# Patient Record
Sex: FEMALE | Race: WHITE | NOT HISPANIC OR LATINO | Employment: OTHER | URBAN - METROPOLITAN AREA
[De-identification: names, ages, dates, MRNs, and addresses within clinical notes are randomized per-mention and may not be internally consistent; named-entity substitution may affect disease eponyms.]

---

## 2017-02-03 NOTE — PROGRESS NOTES
SUBJECTIVE:     CC: Rossy Nagel is an 31 year old woman who presents for preventive health visit.     Physical  Annual:     Getting at least 3 servings of Calcium per day::  NO    Bi-annual eye exam::  Yes    Dental care twice a year::  NO    Sleep apnea or symptoms of sleep apnea::  Daytime drowsiness    Diet::  Regular (no restrictions)    Frequency of exercise::  None    Taking medications regularly::  Yes    Medication side effects::  None    Additional concerns today::  YES        Concern - Hemorrhoids      Onset: 2009     Description:   Bleeding    Intensity: moderate    Progression of Symptoms:  worsening and bleeding with each BM and increasing in size    Accompanying Signs & Symptoms:  Pain with BM,       Previous history of similar problem:   N/A    Precipitating factors:   Worsened by: BM    Alleviating factors:  Improved by: tucks pads     Therapies Tried and outcome: N/A    Today's PHQ-2 Score:   PHQ-2 ( 1999 Pfizer) 2/9/2017   Q1: Little interest or pleasure in doing things -   Q2: Feeling down, depressed or hopeless -   PHQ-2 Score -   Little interest or pleasure in doing things Not at all   Feeling down, depressed or hopeless Several days   PHQ-2 Score 1       Abuse: Current or Past(Physical, Sexual or Emotional)- No  Do you feel safe in your environment - Yes    Social History   Substance Use Topics     Smoking status: Never Smoker      Smokeless tobacco: Never Used     Alcohol Use: Yes      Comment: Rarely     The patient does not drink >3 drinks per day nor >7 drinks per week.    Recent Labs   Lab Test  07/14/14   1413   CHOL  155   HDL  44*   LDL  94   TRIG  83   CHOLHDLRATIO  3.0       Reviewed orders with patient.  Reviewed health maintenance and updated orders accordingly - Yes    Mammo Decision Support:  Mammogram not appropriate for this patient based on age.    Pertinent mammograms are reviewed under the imaging tab.  History of abnormal Pap smear: NO - age 30- 65 PAP every 3 years  "recommended  All Histories reviewed and updated in Epic.      ROS:  C: NEGATIVE for fever, chills, change in weight  I: NEGATIVE for worrisome rashes, moles or lesions  E: NEGATIVE for vision changes or irritation  ENT: NEGATIVE for ear, mouth and throat problems  R: NEGATIVE for significant cough or SOB  B: NEGATIVE for masses, tenderness or discharge  CV: NEGATIVE for chest pain, palpitations or peripheral edema  GI: NEGATIVE for nausea, abdominal pain, heartburn, or change in bowel habits  : NEGATIVE for unusual urinary or vaginal symptoms. No vaginal bleeding.  M: NEGATIVE for significant arthralgias or myalgia  N: NEGATIVE for weakness, dizziness or paresthesias  P: increased anxiety    Problem list, Medication list, Allergies, and Medical/Social/Surgical histories reviewed in [facility] and updated as appropriate.  OBJECTIVE:     /68 mmHg  Pulse 64  Temp(Src) 98.4  F (36.9  C) (Oral)  Resp 16  Ht 5' 1\" (1.549 m)  Wt 137 lb (62.143 kg)  BMI 25.90 kg/m2  LMP 01/20/2017 (Exact Date)  Breastfeeding? No  EXAM:  GENERAL: healthy, alert and no distress  EYES: Eyes grossly normal to inspection, PERRL and conjunctivae and sclerae normal  HENT: ear canals and TM's normal, nose and mouth without ulcers or lesions  NECK: no adenopathy, no asymmetry, masses, or scars and thyroid normal to palpation  RESP: lungs clear to auscultation - no rales, rhonchi or wheezes  BREAST: normal without masses, tenderness or nipple discharge and no palpable axillary masses or adenopathy  CV: regular rate and rhythm, normal S1 S2, no S3 or S4, no murmur, click or rub, no peripheral edema and peripheral pulses strong  ABDOMEN: soft, nontender, no hepatosplenomegaly, no masses and bowel sounds normal   (female): normal female external genitalia, normal urethral meatus, vaginal mucosa pink, moist, well rugated, and normal cervix/adnexa/uterus without masses or discharge  RECTAL: external hemorrhoid  MS: no gross musculoskeletal " "defects noted, no edema  SKIN: no suspicious lesions or rashes  NEURO: Normal strength and tone, mentation intact and speech normal  PSYCH: mentation appears normal, affect normal/bright    ASSESSMENT/PLAN:         ICD-10-CM    1. Encounter for routine adult health examination without abnormal findings Z00.00    2. Need for prophylactic vaccination and inoculation against influenza Z23 FLU VAC, SPLIT VIRUS IM > 3 YO (QUADRIVALENT) [85012]     Vaccine Administration, Initial [39112]   3. Anxiety F41.9 sertraline (ZOLOFT) 100 MG tablet   4. Major depressive disorder, recurrent episode, moderate (H) F33.1 sertraline (ZOLOFT) 100 MG tablet   5. Screening for cervical cancer Z12.4 Pap imaged thin layer screen with HPV - recommended age 30 - 65 years (select HPV order below)     HPV High Risk Types DNA Cervical     F/u 6 months mood.  Would like to keep meds the same.  Declines any change.  Had a work conflict 6 months ago that is not yet resolved and discussed need to get closure on this (either get new job or discuss with manager and work it out) has been avoiding. Likely would do well with counseling, but declines.      COUNSELING:  Reviewed preventive health counseling, as reflected in patient instructions       Regular exercise       Healthy diet/nutrition         reports that she has never smoked. She has never used smokeless tobacco.    Estimated body mass index is 25.9 kg/(m^2) as calculated from the following:    Height as of this encounter: 5' 1\" (1.549 m).    Weight as of this encounter: 137 lb (62.143 kg).   Weight management plan: Discussed healthy diet and exercise guidelines and patient will follow up in 12 months in clinic to re-evaluate.    Counseling Resources:  ATP IV Guidelines  Pooled Cohorts Equation Calculator  Breast Cancer Risk Calculator  FRAX Risk Assessment  ICSI Preventive Guidelines  Dietary Guidelines for Americans, 2010  USDA's MyPlate  ASA Prophylaxis  Lung CA Screening    Carmita Ramos MD, " MD  [facility]

## 2017-02-09 ENCOUNTER — OFFICE VISIT (OUTPATIENT)
Dept: FAMILY MEDICINE | Facility: OTHER | Age: 32
End: 2017-02-09

## 2017-02-09 VITALS
SYSTOLIC BLOOD PRESSURE: 106 MMHG | TEMPERATURE: 98.4 F | HEIGHT: 61 IN | HEART RATE: 64 BPM | WEIGHT: 137 LBS | DIASTOLIC BLOOD PRESSURE: 68 MMHG | RESPIRATION RATE: 16 BRPM | BODY MASS INDEX: 25.86 KG/M2

## 2017-02-09 DIAGNOSIS — Z23 NEED FOR PROPHYLACTIC VACCINATION AND INOCULATION AGAINST INFLUENZA: ICD-10-CM

## 2017-02-09 DIAGNOSIS — Z12.4 SCREENING FOR CERVICAL CANCER: ICD-10-CM

## 2017-02-09 DIAGNOSIS — F41.9 ANXIETY: ICD-10-CM

## 2017-02-09 DIAGNOSIS — Z00.00 ENCOUNTER FOR ROUTINE ADULT HEALTH EXAMINATION WITHOUT ABNORMAL FINDINGS: Primary | ICD-10-CM

## 2017-02-09 DIAGNOSIS — F33.1 MAJOR DEPRESSIVE DISORDER, RECURRENT EPISODE, MODERATE (H): ICD-10-CM

## 2017-02-09 PROCEDURE — 87624 HPV HI-RISK TYP POOLED RSLT: CPT | Performed by: FAMILY MEDICINE

## 2017-02-09 PROCEDURE — G0145 SCR C/V CYTO,THINLAYER,RESCR: HCPCS | Performed by: FAMILY MEDICINE

## 2017-02-09 PROCEDURE — 90686 IIV4 VACC NO PRSV 0.5 ML IM: CPT | Performed by: FAMILY MEDICINE

## 2017-02-09 PROCEDURE — 99395 PREV VISIT EST AGE 18-39: CPT | Mod: 25 | Performed by: FAMILY MEDICINE

## 2017-02-09 PROCEDURE — 90471 IMMUNIZATION ADMIN: CPT | Performed by: FAMILY MEDICINE

## 2017-02-09 RX ORDER — SERTRALINE HYDROCHLORIDE 100 MG/1
100 TABLET, FILM COATED ORAL DAILY
Qty: 90 TABLET | Refills: 1 | Status: CANCELLED | OUTPATIENT
Start: 2017-02-09

## 2017-02-09 RX ORDER — SERTRALINE HYDROCHLORIDE 100 MG/1
100 TABLET, FILM COATED ORAL DAILY
Qty: 90 TABLET | Refills: 1 | Status: SHIPPED | OUTPATIENT
Start: 2017-02-09 | End: 2017-08-28

## 2017-02-09 ASSESSMENT — PATIENT HEALTH QUESTIONNAIRE - PHQ9
SUM OF ALL RESPONSES TO PHQ QUESTIONS 1-9: 8
10. IF YOU CHECKED OFF ANY PROBLEMS, HOW DIFFICULT HAVE THESE PROBLEMS MADE IT FOR YOU TO DO YOUR WORK, TAKE CARE OF THINGS AT HOME, OR GET ALONG WITH OTHER PEOPLE: SOMEWHAT DIFFICULT

## 2017-02-09 ASSESSMENT — ANXIETY QUESTIONNAIRES
7. FEELING AFRAID AS IF SOMETHING AWFUL MIGHT HAPPEN: 1 = SEVERAL DAYS
GAD7 TOTAL SCORE: 8
GAD7 TOTAL SCORE: 8

## 2017-02-09 ASSESSMENT — PAIN SCALES - GENERAL: PAINLEVEL: NO PAIN (0)

## 2017-02-09 NOTE — LETTER
[address]  [facility] [address]  [phone]      February 22, 2017    Rossy Nagel  [address] [facility] [address]    Dear Rossy,  We are happy to inform you that your PAP smear result from 2/9/17 is normal.  We are now able to do a follow up test on PAP smears. The DNA test is for HPV (Human Papilloma Virus). Cervical cancer is closely linked with certain types of HPV. Your result showed no evidence of high risk HPV.  Therefore we recommend you return in 3 years for your next pap smear.  You will still need to return to the clinic every year for an annual exam and other preventive tests.  Please contact the clinic with any questions.  Sincerely,  Carmita Ramos MD/becky

## 2017-02-09 NOTE — NURSING NOTE
"Chief Complaint   Patient presents with     Physical     pap due this year     Panel Management     flu       Initial /68 mmHg  Pulse 64  Temp(Src) 98.4  F (36.9  C) (Oral)  Resp 16  Ht 5' 1\" (1.549 m)  Wt 137 lb (62.143 kg)  BMI 25.90 kg/m2  LMP 01/20/2017 (Exact Date)  Breastfeeding? No Estimated body mass index is 25.9 kg/(m^2) as calculated from the following:    Height as of this encounter: 5' 1\" (1.549 m).    Weight as of this encounter: 137 lb (62.143 kg).  Medication Reconciliation: complete  "

## 2017-02-09 NOTE — PROGRESS NOTES
Prior to injection verified patient identity using patient's name and date of birth.  Per orders of Dr. Ramos, injection of flu given by Dora Tolbert. Patient instructed to remain in clinic for 20 minutes afterwards, and to report any adverse reaction to me immediately.    Injectable Influenza Immunization Documentation    1.  Is the person to be vaccinated sick today?  No    2. Does the person to be vaccinated have an allergy to eggs or to a component of the vaccine?  No    3. Has the person to be vaccinated today ever had a serious reaction to influenza vaccine in the past?  No    4. Has the person to be vaccinated ever had Guillain-[place] syndrome?  No     Form completed by Madeline Tolbert, [facility]

## 2017-02-10 ASSESSMENT — PATIENT HEALTH QUESTIONNAIRE - PHQ9: SUM OF ALL RESPONSES TO PHQ QUESTIONS 1-9: 8

## 2017-02-10 ASSESSMENT — ANXIETY QUESTIONNAIRES: GAD7 TOTAL SCORE: 8

## 2017-02-13 LAB
COPATH REPORT: NORMAL
PAP: NORMAL

## 2017-02-15 LAB
FINAL DIAGNOSIS: NORMAL
HPV HR 12 DNA CVX QL NAA+PROBE: NEGATIVE
HPV16 DNA SPEC QL NAA+PROBE: NEGATIVE
HPV18 DNA SPEC QL NAA+PROBE: NEGATIVE
SPECIMEN DESCRIPTION: NORMAL

## 2017-08-23 NOTE — PROGRESS NOTES
"  SUBJECTIVE:                                                    Rossy Nagel is a 32 year old female who presents to clinic today for the following health issues:      Depression   Depression & Anxiety Follow-up:     Depression/Anxiety:  Depression & Anxiety    Status since last visit::  Stable    Other associated symptoms of depression and anxiety::  YES (Cant sleep, once a sleep could constantly sleep, feels tired to fall asleep )    Significant life event::  YES (got a new dog )    Current substance use::  None    PSYCH: The patient reports that her mood is stable with her current medication dosage. She does not want any changes to her dosage at this time. She is currently walking with her dog 1 mile daily which she reports improves her mood.     CONST: The patient reports that her family hx of sensitivity to polyester might be a potential cause for her night sweats. She reports that her night sweat severity has been improving recently. She reports experiencing a \"static-like\" feeling in her mind before sleeping.    Problem list and histories reviewed & adjusted, as indicated.  Additional history: as documented    Patient Active Problem List   Diagnosis     CARDIOVASCULAR SCREENING; LDL GOAL LESS THAN 160     Major depressive disorder, recurrent episode, moderate (H)     Anxiety     IUD (intrauterine device) in place     Past Surgical History:   Procedure Laterality Date     NO HISTORY OF SURGERY         Social History   Substance Use Topics     Smoking status: Never Smoker     Smokeless tobacco: Never Used     Alcohol use Yes      Comment: Rarely     Family History   Problem Relation Age of Onset     DIABETES Mother      Type II     Depression Mother      Obesity Mother      overweight     Hypertension Mother      Hypertension Maternal Grandmother      HEART DISEASE Maternal Grandfather      Bypass     Hypertension Maternal Grandfather      CANCER Paternal Grandmother      ovarian or uterine     Asthma " Brother              ROS:  Constitutional, HEENT, cardiovascular, pulmonary, GI, , musculoskeletal, neuro, skin, endocrine and psych systems are negative, except as in HPI or otherwise noted     This document serves as a record of the services and decisions personally performed and made by Carmita Ramos MD. It was created on her behalf by Bev Shannon , a trained medical scribe. The creation of this document is based the provider's statements to the medical scribe.  Bev Shannon, August 28, 2017 1:55 PM     OBJECTIVE:                                                    /72  Pulse 78  Temp 97.9  F (36.6  C) (Temporal)  Resp 16  Wt 136 lb (61.7 kg)  SpO2 98%  BMI 25.7 kg/m2  Body mass index is 25.7 kg/(m^2).   GENERAL: healthy, alert, well nourished, well hydrated, no distress  SKIN: no suspicious lesions, no rashes  PSYCH: Alert and oriented times 3; speech- coherent , normal rate and volume; able to articulate logical thoughts, able to abstract reason, no tangential thoughts, no hallucinations or delusions, affect- normal    No results found for this or any previous visit (from the past 24 hour(s)).     ASSESSMENT/PLAN:                                                        ICD-10-CM    1. Anxiety F41.9 sertraline (ZOLOFT) 100 MG tablet   2. Major depressive disorder, recurrent episode, moderate (H) F33.1 sertraline (ZOLOFT) 100 MG tablet   3. Overweight E66.3      -The patient reports that her mood is stable with her current medication dosage. She does not want any changes to her dosage at this time. Medication refilled.   -Discussed weight. Advised healthy diet and exercise.  -Discussed night sweat symptoms in some detail. Gave self help info to the patient and the patient declined medication options for sleep specific meds.  Just working toward calming and relaxing techniques as wants to avoid more meds.    Patient Instructions   -Avoid memory foam pillows to reduce heat at night.    -Get into a  meditation/yoga/deep breathing routine daily.    -6 mo F/U for mood. eVisit is ok.      The information in this document, created by the medical scribe for me, accurately reflects the services I personally performed and the decisions made by me. I have reviewed and approved this document for accuracy.   MD Carmita Castañeda MD, MD  [facility]  Answers for HPI/ROS submitted by the patient on 8/28/2017   If you checked off any problems, how difficult have these problems made it for you to do your work, take care of things at home, or get along with other people?: Not difficult at all  PHQ9 TOTAL SCORE: 7  PENNY 7 TOTAL SCORE: 6

## 2017-08-28 ENCOUNTER — OFFICE VISIT (OUTPATIENT)
Dept: FAMILY MEDICINE | Facility: OTHER | Age: 32
End: 2017-08-28
Payer: COMMERCIAL

## 2017-08-28 VITALS
DIASTOLIC BLOOD PRESSURE: 72 MMHG | BODY MASS INDEX: 25.7 KG/M2 | OXYGEN SATURATION: 98 % | RESPIRATION RATE: 16 BRPM | SYSTOLIC BLOOD PRESSURE: 116 MMHG | TEMPERATURE: 97.9 F | WEIGHT: 136 LBS | HEART RATE: 78 BPM

## 2017-08-28 DIAGNOSIS — F41.9 ANXIETY: Primary | ICD-10-CM

## 2017-08-28 DIAGNOSIS — E66.3 OVERWEIGHT: ICD-10-CM

## 2017-08-28 DIAGNOSIS — F33.1 MAJOR DEPRESSIVE DISORDER, RECURRENT EPISODE, MODERATE (H): ICD-10-CM

## 2017-08-28 PROCEDURE — 99214 OFFICE O/P EST MOD 30 MIN: CPT | Performed by: FAMILY MEDICINE

## 2017-08-28 RX ORDER — SERTRALINE HYDROCHLORIDE 100 MG/1
100 TABLET, FILM COATED ORAL DAILY
Qty: 90 TABLET | Refills: 1 | Status: SHIPPED | OUTPATIENT
Start: 2017-08-28 | End: 2018-03-15

## 2017-08-28 ASSESSMENT — PATIENT HEALTH QUESTIONNAIRE - PHQ9
10. IF YOU CHECKED OFF ANY PROBLEMS, HOW DIFFICULT HAVE THESE PROBLEMS MADE IT FOR YOU TO DO YOUR WORK, TAKE CARE OF THINGS AT HOME, OR GET ALONG WITH OTHER PEOPLE: NOT DIFFICULT AT ALL
SUM OF ALL RESPONSES TO PHQ QUESTIONS 1-9: 7
SUM OF ALL RESPONSES TO PHQ QUESTIONS 1-9: 7

## 2017-08-28 ASSESSMENT — ANXIETY QUESTIONNAIRES
7. FEELING AFRAID AS IF SOMETHING AWFUL MIGHT HAPPEN: SEVERAL DAYS
2. NOT BEING ABLE TO STOP OR CONTROL WORRYING: NOT AT ALL
GAD7 TOTAL SCORE: 6
GAD7 TOTAL SCORE: 6
7. FEELING AFRAID AS IF SOMETHING AWFUL MIGHT HAPPEN: SEVERAL DAYS
1. FEELING NERVOUS, ANXIOUS, OR ON EDGE: SEVERAL DAYS
4. TROUBLE RELAXING: SEVERAL DAYS
GAD7 TOTAL SCORE: 6
6. BECOMING EASILY ANNOYED OR IRRITABLE: MORE THAN HALF THE DAYS
5. BEING SO RESTLESS THAT IT IS HARD TO SIT STILL: SEVERAL DAYS
3. WORRYING TOO MUCH ABOUT DIFFERENT THINGS: NOT AT ALL

## 2017-08-28 ASSESSMENT — PAIN SCALES - GENERAL: PAINLEVEL: NO PAIN (0)

## 2017-08-28 NOTE — PATIENT INSTRUCTIONS
-Avoid memory foam pillows to reduce heat at night.    -Get into a meditation/yoga/deep breathing routine daily.    -6 mo F/U for mood. eVisit is ok.

## 2017-08-28 NOTE — MR AVS SNAPSHOT
"              After Visit Summary   8/28/2017    Rossy Nagel    MRN: [id number]           Patient Information     Date Of Birth          1985        Visit Information        Provider Department      8/28/2017 1:30 PM Carmita Ramos MD [facility]        Today's Diagnoses     Need for prophylactic vaccination and inoculation against influenza    -  1    Anxiety        Major depressive disorder, recurrent episode, moderate (H)          Care Instructions    -Avoid memory foam pillows to reduce heat at night.    -Get into a meditation/yoga/deep breathing routine daily.    -6 mo F/U for mood. eVisit is ok.          Follow-ups after your visit        Who to contact     If you have questions or need follow up information about today's clinic visit or your schedule please contact [facility] directly at [phone].  Normal or non-critical lab and imaging results will be communicated to you by Figmahart, letter or phone within 4 business days after the clinic has received the results. If you do not hear from us within 7 days, please contact the clinic through Figmahart or phone. If you have a critical or abnormal lab result, we will notify you by phone as soon as possible.  Submit refill requests through AMS-Qi or call your pharmacy and they will forward the refill request to us. Please allow 3 business days for your refill to be completed.          Additional Information About Your Visit        MyChart Information     AMS-Qi lets you send messages to your doctor, view your test results, renew your prescriptions, schedule appointments and more. To sign up, go to [email or web address] . Click on \"Log in\" on the left side of the screen, which will take you to the Welcome page. Then click on \"Sign up Now\" on the right side of the page.     You will be asked to enter the access code listed below, as well as some personal information. Please follow the directions to create your " username and password.     Your access code is: [email or web address]  Expires: 2017  2:12 PM     Your access code will  in 90 days. If you need help or a new code, please call your [facility] clinic or [phone].        Care EveryWhere ID     This is your Care EveryWhere ID. This could be used by other organizations to access your [facility] medical records  [id number]        Your Vitals Were     Pulse Temperature Respirations Pulse Oximetry BMI (Body Mass Index)       78 97.9  F (36.6  C) (Temporal) 16 98% 25.7 kg/m2        Blood Pressure from Last 3 Encounters:   17 116/72   17 106/68   16 110/70    Weight from Last 3 Encounters:   17 136 lb (61.7 kg)   17 137 lb (62.1 kg)   16 127 lb 3.2 oz (57.7 kg)              Today, you had the following     No orders found for display         Where to get your medicines      These medications were sent to [facility] [address] [place] [address] [address]     Phone:  [phone]     sertraline 100 MG tablet          Primary Care Provider Office Phone # Fax #    Carmita Ramos [phone]       [address] [place] [address]        Equal Access to Services     [facility]: Hadii vince vaughn hadyvonneo Sotalib, waaxda luqadaha, qaybta kaalmaclotilde melendrez, khurram malhotra . So [facility] [phone].    ATENCIÓN: Si habla español, tiene a mccurdy disposición servicios gratuitos de asistencia lingüística. Llame al [phone].    We comply with applicable federal civil rights laws and [place] laws. We do not discriminate on the basis of race, color, national origin, age, disability sex, sexual orientation or gender identity.            Thank you!     Thank you for choosing [facility]  for your care. Our goal is always to provide you with excellent care. Hearing back from our patients is one way we can continue to improve our services. Please  take a few minutes to complete the written survey that you may receive in the mail after your visit with us. Thank you!             Your Updated Medication List - Protect others around you: Learn how to safely use, store and throw away your medicines at www.disposemymeds.org.          This list is accurate as of: 8/28/17  2:12 PM.  Always use your most recent med list.                   Brand Name Dispense Instructions for use Diagnosis    paragard intrauterine copper      1 each by Intrauterine route once        sertraline 100 MG tablet    ZOLOFT    90 tablet    Take 1 tablet (100 mg) by mouth daily    Anxiety, Major depressive disorder, recurrent episode, moderate (H)

## 2017-08-28 NOTE — NURSING NOTE
"Chief Complaint   Patient presents with     Depression     Panel Management     PHQ9/GAD7, mychart        Initial /72  Pulse 78  Temp 97.9  F (36.6  C) (Temporal)  Resp 16  Wt 136 lb (61.7 kg)  SpO2 98%  BMI 25.7 kg/m2 Estimated body mass index is 25.7 kg/(m^2) as calculated from the following:    Height as of 2/9/17: 5' 1\" (1.549 m).    Weight as of this encounter: 136 lb (61.7 kg).  Medication Reconciliation: complete   Josy Rae MA      "

## 2017-08-29 ASSESSMENT — PATIENT HEALTH QUESTIONNAIRE - PHQ9: SUM OF ALL RESPONSES TO PHQ QUESTIONS 1-9: 7

## 2017-08-29 ASSESSMENT — ANXIETY QUESTIONNAIRES: GAD7 TOTAL SCORE: 6

## 2018-03-15 DIAGNOSIS — F41.9 ANXIETY: ICD-10-CM

## 2018-03-15 DIAGNOSIS — F33.1 MAJOR DEPRESSIVE DISORDER, RECURRENT EPISODE, MODERATE (H): ICD-10-CM

## 2018-03-15 RX ORDER — SERTRALINE HYDROCHLORIDE 100 MG/1
100 TABLET, FILM COATED ORAL DAILY
Qty: 30 TABLET | Refills: 0 | Status: SHIPPED | OUTPATIENT
Start: 2018-03-15 | End: 2018-03-21

## 2018-03-15 NOTE — TELEPHONE ENCOUNTER
Reason for call:  Medication  Reason for Call:  Medication or medication refill:    Do you use a [facility]?  Name of the pharmacy and phone number for the current request:  [facility] - [phone] (Fax [phone])    Name of the medication requested:      Disp Refills Start End SHELLY   sertraline (ZOLOFT) 100 MG tablet            Other request: pt is all out needs this medication asap. Also pt does have an appointment on 3/21st with . Please advise.     Can we leave a detailed message on this number? YES    Phone number patient can be reached at: Cell number on file:    Telephone Information:   Mobile [phone]       Best Time: anytime    Call taken on 3/15/2018 at 10:56 AM by Mirian Cuevas

## 2018-03-15 NOTE — TELEPHONE ENCOUNTER
Zoloft:  Medication is being filled for 1 time refill only due to:  Patient needs to be seen because mood follow up     Next 5 appointments (look out 90 days)     Mar 21, 2018  1:30 PM CDT   [facility] with Carmita Ramos MD   [facility]    [address] [place] [id number]   [phone]                Trinidad Ballard RN, BSN

## 2018-03-16 NOTE — PROGRESS NOTES
"  SUBJECTIVE:   Rossy Nagel is a 32 year old female who presents to clinic today for the following health issues:    History of Present Illness     Depression & Anxiety Follow-up:     Depression/Anxiety:  Depression & Anxiety    Status since last visit::  Worsened    Other associated symptoms of depression and anxiety::  YES    Significant life event::  No    Current substance use::  None       Today's PHQ-9         PHQ-9 Total Score:     (P) 7   PHQ-9 Q9 Suicidal ideation:   (P) Several days   Thoughts of suicide or self harm:      Self-harm Plan:        Self-harm Action:          Safety concerns for self or others:       PENNY-7 Total Score: (P) 8    Has been using Zoloft, but ran out and was out of it for a week. She states that she doesn't \"feel stressed\"., but is reporting more symptoms. She does think that she was doing much worse when she was off the Zoloft for a week, but has started to rebound since restarting the medication. She reports that December was very rough for her for some unknown reason. 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        Answers for HPI/ROS submitted by the patient on 3/21/2018   PENNY 7 TOTAL SCORE: 8  If you checked off any problems, how difficult have these problems made it for you to do your work, take care of things at home, or get along with other people?: Very difficult  PHQ9 TOTAL SCORE: 7    PHQ-9 SCORE 2/9/2017 8/28/2017 3/21/2018   Total Score - - -   Total Score MyChart 8 (Mild depression) 7 (Mild depression) 7 (Mild depression)   Total Score - 7 7     PENNY-7 SCORE 2/9/2017 8/28/2017 3/21/2018   Total Score - - -   Total Score 8 (mild anxiety) 6 (mild anxiety) 8 (mild anxiety)   Total Score - 6 8       Rash  Onset: Last Tuesday     Description:   Location: All over the body, started on stomach  Character: round, raised, red  Itching (Pruritis): YES    Progression of Symptoms:  worsening    Accompanying Signs & Symptoms:  Fever: no   Body aches " or joint pain: no   Sore throat symptoms: no   Recent cold symptoms: no     History:   Previous similar rash: no     Precipitating factors:   Exposure to similar rash: no   New exposures: None   Recent travel: no     Alleviating factors:  None    Therapies Tried and outcome: Lotions, coconut oil, calamine lotion, benadryl, essential oils, cold pack, nothing has made it better    She has been scratching at what she can reach. She cannot reach her back. States that they first looked like a skin colored bump before they become too red. Itching starts before the bump develops.      Problem list and histories reviewed & adjusted, as indicated.  Additional history: as documented    Patient Active Problem List   Diagnosis     CARDIOVASCULAR SCREENING; LDL GOAL LESS THAN 160     Major depressive disorder, recurrent episode, moderate (H)     Anxiety     IUD (intrauterine device) in place     Past Surgical History:   Procedure Laterality Date     NO HISTORY OF SURGERY         Social History   Substance Use Topics     Smoking status: Never Smoker     Smokeless tobacco: Never Used     Alcohol use Yes      Comment: Rarely     Family History   Problem Relation Age of Onset     DIABETES Mother      Type II     Depression Mother      Obesity Mother      overweight     Hypertension Mother      Hypertension Maternal Grandmother      HEART DISEASE Maternal Grandfather      Bypass     Hypertension Maternal Grandfather      CANCER Paternal Grandmother      ovarian or uterine     Asthma Brother            ROS:  Constitutional, HEENT, cardiovascular, pulmonary, GI, , musculoskeletal, neuro, skin, endocrine and psych systems are negative, except as in HPI or otherwise noted.     This document serves as a record of the services and decisions personally performed and made by Carmita Ramos MD. It was created on her behalf by Torri Reza, a trained medical scribe. The creation of this document is based the provider's statements to the medical  "arnoldo.  Torri Reza, March 21, 2018 1:28 PM       OBJECTIVE:                                                    /70  Pulse 82  Temp 99  F (37.2  C) (Temporal)  Resp 16  Ht 1.56 m (5' 1.42\")  Wt 62.1 kg (137 lb)  LMP 03/13/2018  SpO2 96%  BMI 25.54 kg/m2  Body mass index is 25.54 kg/(m^2).   GENERAL: healthy, alert, well nourished, well hydrated, no distress  SKIN: Rash with small, raised bumps across stomach, upper arms, back, and buttocks. Redder and scratched on arms and stomach, red on shoulders and closer to skin color on buttocks  PSYCH: Alert and oriented times 3; speech- coherent , normal rate and volume; able to articulate logical thoughts, able to abstract reason, no tangential thoughts, no hallucinations or delusions, affect- normal.    Diagnostic test results:  No results found for this or any previous visit (from the past 24 hour(s)).     ASSESSMENT/PLAN:                                                        ICD-10-CM    1. Flexural eczema L20.82 predniSONE (DELTASONE) 20 MG tablet   2. Anxiety F41.9 sertraline (ZOLOFT) 100 MG tablet   3. Major depressive disorder, recurrent episode, moderate (H) F33.1 sertraline (ZOLOFT) 100 MG tablet     Mood: She seems to be improving after being off her meds for a week. Refilled Zoloft 100 mg for 6 months and instructed to schedule an e-visit or phone visit if she feels her symptoms are worsening or not improving in the next few weeks.     Eczema: Her rash is most consistent with eczema, instructed in eczema care. Stay hydrated, avoid caffeine and hot showers, moisturize daily. Prescribed an oral steroid to help her catch up with the current outbreak.       Patient Instructions   Refilled your Zoloft for 6 months. If you are noticing any worsening changes in your mood at all, don't hesitate to shoot me a message or schedule an evisit to catch up, can potentially increase your dosage.       Recommended drinking more water to stay hydrated and improve " eczema rash. You can try using a flavor diffuser, adding sugar free and caffeine free flavors to water can make drinking water more enjoyable.     Water can be your best friend or worst enemy.    If water is not your enemy, shower/bath daily.    NEVER soak in bubble bath, oils, etc.    Keep them to 15-30 minutes in lukewarm (NOT HOT) water.  After shower, pat/blot dry and apply moisturizer within minutes    Products that are advised for eczema include:  Soaps -- Dove, Caress, or Basis (only need in underarms and groin unless dirt noted)  Lotions -- Cera Ve, Cetaphil, Vanicream, Vaseline  Petroleum jelly can be used for extra moisturizer when needed but is greasy.        The information in this document, created by the medical scribe for me, accurately reflects the services I personally performed and the decisions made by me. I have reviewed and approved this document for accuracy.   MD Carmita Castañeda MD, MD  [facility]

## 2018-03-21 ENCOUNTER — OFFICE VISIT (OUTPATIENT)
Dept: FAMILY MEDICINE | Facility: OTHER | Age: 33
End: 2018-03-21
Payer: COMMERCIAL

## 2018-03-21 VITALS
SYSTOLIC BLOOD PRESSURE: 124 MMHG | BODY MASS INDEX: 25.86 KG/M2 | RESPIRATION RATE: 16 BRPM | WEIGHT: 137 LBS | HEART RATE: 82 BPM | TEMPERATURE: 99 F | OXYGEN SATURATION: 96 % | HEIGHT: 61 IN | DIASTOLIC BLOOD PRESSURE: 70 MMHG

## 2018-03-21 DIAGNOSIS — F33.1 MAJOR DEPRESSIVE DISORDER, RECURRENT EPISODE, MODERATE (H): ICD-10-CM

## 2018-03-21 DIAGNOSIS — L20.82 FLEXURAL ECZEMA: Primary | ICD-10-CM

## 2018-03-21 DIAGNOSIS — F41.9 ANXIETY: ICD-10-CM

## 2018-03-21 PROCEDURE — 99214 OFFICE O/P EST MOD 30 MIN: CPT | Performed by: FAMILY MEDICINE

## 2018-03-21 RX ORDER — PREDNISONE 20 MG/1
20 TABLET ORAL DAILY
Qty: 5 TABLET | Refills: 0 | Status: SHIPPED | OUTPATIENT
Start: 2018-03-21 | End: 2018-10-01

## 2018-03-21 RX ORDER — SERTRALINE HYDROCHLORIDE 100 MG/1
100 TABLET, FILM COATED ORAL DAILY
Qty: 90 TABLET | Refills: 1 | Status: SHIPPED | OUTPATIENT
Start: 2018-03-21 | End: 2018-09-06

## 2018-03-21 ASSESSMENT — ANXIETY QUESTIONNAIRES
2. NOT BEING ABLE TO STOP OR CONTROL WORRYING: SEVERAL DAYS
5. BEING SO RESTLESS THAT IT IS HARD TO SIT STILL: SEVERAL DAYS
GAD7 TOTAL SCORE: 8
3. WORRYING TOO MUCH ABOUT DIFFERENT THINGS: SEVERAL DAYS
GAD7 TOTAL SCORE: 8
7. FEELING AFRAID AS IF SOMETHING AWFUL MIGHT HAPPEN: SEVERAL DAYS
7. FEELING AFRAID AS IF SOMETHING AWFUL MIGHT HAPPEN: SEVERAL DAYS
GAD7 TOTAL SCORE: 8
1. FEELING NERVOUS, ANXIOUS, OR ON EDGE: SEVERAL DAYS
6. BECOMING EASILY ANNOYED OR IRRITABLE: MORE THAN HALF THE DAYS
4. TROUBLE RELAXING: SEVERAL DAYS

## 2018-03-21 ASSESSMENT — PAIN SCALES - GENERAL: PAINLEVEL: NO PAIN (0)

## 2018-03-21 ASSESSMENT — PATIENT HEALTH QUESTIONNAIRE - PHQ9
SUM OF ALL RESPONSES TO PHQ QUESTIONS 1-9: 7
SUM OF ALL RESPONSES TO PHQ QUESTIONS 1-9: 7
10. IF YOU CHECKED OFF ANY PROBLEMS, HOW DIFFICULT HAVE THESE PROBLEMS MADE IT FOR YOU TO DO YOUR WORK, TAKE CARE OF THINGS AT HOME, OR GET ALONG WITH OTHER PEOPLE: VERY DIFFICULT

## 2018-03-21 NOTE — MR AVS SNAPSHOT
After Visit Summary   3/21/2018    Rossy Nagel    MRN: [id number]           Patient Information     Date Of Birth          1985        Visit Information        Provider Department      3/21/2018 1:30 PM Carmita Ramos MD [facility]        Today's Diagnoses     Flexural eczema    -  1    Anxiety        Major depressive disorder, recurrent episode, moderate (H)          Care Instructions    Refilled your Zoloft for 6 months. If you are noticing any worsening changes in your mood at all, don't hesitate to shoot me a message or schedule an evisit to catch up, can potentially increase your dosage.       Recommended drinking more water to stay hydrated and improve eczema rash. You can try using a flavor diffuser, adding sugar free and caffeine free flavors to water can make drinking water more enjoyable.     Water can be your best friend or worst enemy.    If water is not your enemy, shower/bath daily.    NEVER soak in bubble bath, oils, etc.    Keep them to 15-30 minutes in lukewarm (NOT HOT) water.  After shower, pat/blot dry and apply moisturizer within minutes    Products that are advised for eczema include:  Soaps -- Dove, Caress, or Basis (only need in underarms and groin unless dirt noted)  Lotions -- Cera Ve, Cetaphil, Vanicream, Vaseline  Petroleum jelly can be used for extra moisturizer when needed but is greasy.            Follow-ups after your visit        Follow-up notes from your care team     Return in about 3 months (around 6/21/2018), or if symptoms worsen or fail to improve, for Mental Health Re-check.      Who to contact     If you have questions or need follow up information about today's clinic visit or your schedule please contact [facility] directly at [phone].  Normal or non-critical lab and imaging results will be communicated to you by MyChart, letter or phone within 4 business days after the clinic has received the results. If you  "do not hear from us within 7 days, please contact the clinic through Webcollage or phone. If you have a critical or abnormal lab result, we will notify you by phone as soon as possible.  Submit refill requests through Webcollage or call your pharmacy and they will forward the refill request to us. Please allow 3 business days for your refill to be completed.          Additional Information About Your Visit        yaM LabsharYorder Information     Webcollage lets you send messages to your doctor, view your test results, renew your prescriptions, schedule appointments and more. To sign up, go to [email or web address] . Click on \"Log in\" on the left side of the screen, which will take you to the Welcome page. Then click on \"Sign up Now\" on the right side of the page.     You will be asked to enter the access code listed below, as well as some personal information. Please follow the directions to create your username and password.     Your access code is: [email or web address]  Expires: 2018  1:46 PM     Your access code will  in 90 days. If you need help or a new code, please call your [facility] clinic or [phone].        Care EveryWhere ID     This is your Care EveryWhere ID. This could be used by other organizations to access your [facility] medical records  [id number]        Your Vitals Were     Pulse Temperature Respirations Height Last Period Pulse Oximetry    82 99  F (37.2  C) (Temporal) 16 5' 1.42\" (1.56 m) 2018 96%    BMI (Body Mass Index)                   25.54 kg/m2            Blood Pressure from Last 3 Encounters:   18 124/70   17 116/72   17 106/68    Weight from Last 3 Encounters:   18 137 lb (62.1 kg)   17 136 lb (61.7 kg)   17 137 lb (62.1 kg)              We Performed the Following     DEPRESSION ACTION PLAN (DAP)          Today's Medication Changes          These changes are accurate as of 3/21/18  1:46 PM.  If you have any questions, ask your nurse or doctor.          "      Start taking these medicines.        Dose/Directions    predniSONE 20 MG tablet   Commonly known as:  DELTASONE   Used for:  Flexural eczema   Started by:  Carmita Ramos MD        Dose:  20 mg   Take 1 tablet (20 mg) by mouth daily   Quantity:  5 tablet   Refills:  0            Where to get your medicines      These medications were sent to [facility] [address] [place], [place] - [address]  [address] , [facility] [address]     Phone:  [phone]     predniSONE 20 MG tablet    sertraline 100 MG tablet                Primary Care Provider Office Phone # Fax #    Carmita Ramos [phone]       [address] [facility] [address]        Equal Access to Services     [facility] : Hadii vince kaspero Sotalib, waaxda luqadaha, qaybta kaalmada ademadhavyada, khurram peck. So [facility] [phone].    ATENCIÓN: Si habla español, tiene a mccurdy disposición servicios gratuitos de asistencia lingüística. Llame al [phone].    We comply with applicable federal civil rights laws and [place] laws. We do not discriminate on the basis of race, color, national origin, age, disability, sex, sexual orientation, or gender identity.            Thank you!     Thank you for choosing [facility]  for your care. Our goal is always to provide you with excellent care. Hearing back from our patients is one way we can continue to improve our services. Please take a few minutes to complete the written survey that you may receive in the mail after your visit with us. Thank you!             Your Updated Medication List - Protect others around you: Learn how to safely use, store and throw away your medicines at www.disposemymeds.org.          This list is accurate as of 3/21/18  1:46 PM.  Always use your most recent med list.                   Brand Name Dispense Instructions for use Diagnosis    paragard intrauterine copper      1 each by Intrauterine route  once        predniSONE 20 MG tablet    DELTASONE    5 tablet    Take 1 tablet (20 mg) by mouth daily    Flexural eczema       sertraline 100 MG tablet    ZOLOFT    90 tablet    Take 1 tablet (100 mg) by mouth daily    Anxiety, Major depressive disorder, recurrent episode, moderate (H)

## 2018-03-21 NOTE — LETTER
My Depression Action Plan  Name: Rossy Nagel   Date of Birth 1985  Date: 3/16/2018    My doctor: Carmita Ramos   My clinic: [address]  [facility] [address] [phone]          GREEN    ZONE   Good Control    What it looks like:     Things are going generally well. You have normal up s and down s. You may even feel depressed from time to time, but bad moods usually last less than a day.   What you need to do:  1. Continue to care for yourself (see self care plan)  2. Check your depression survival kit and update it as needed  3. Follow your physician s recommendations including any medication.  4. Do not stop taking medication unless you consult with your physician first.           YELLOW         ZONE Getting Worse    What it looks like:     Depression is starting to interfere with your life.     It may be hard to get out of bed; you may be starting to isolate yourself from others.    Symptoms of depression are starting to last most all day and this has happened for several days.     You may have suicidal thoughts but they are not constant.   What you need to do:     1. Call your care team, your response to treatment will improve if you keep your care team informed of your progress. Yellow periods are signs an adjustment may need to be made.     2. Continue your self-care, even if you have to fake it!    3. Talk to someone in your support network    4. Open up your depression survival kit           RED    ZONE Medical Alert - Get Help    What it looks like:     Depression is seriously interfering with your life.     You may experience these or other symptoms: You can t get out of bed most days, can t work or engage in other necessary activities, you have trouble taking care of basic hygiene, or basic responsibilities, thoughts of suicide or death that will not go away, self-injurious behavior.     What you need to do:  1. Call your care team and  request a same-day appointment. If they are not available (weekends or after hours) call your local crisis line, emergency room or 911.            Depression Self Care Plan / Survival Kit    Self-Care for Depression  Here s the deal. Your body and mind are really not as separate as most people think.  What you do and think affects how you feel and how you feel influences what you do and think. This means if you do things that people who feel good do, it will help you feel better.  Sometimes this is all it takes.  There is also a place for medication and therapy depending on how severe your depression is, so be sure to consult with your medical provider and/ or Behavioral Health Consultant if your symptoms are worsening or not improving.     In order to better manage my stress, I will:    Exercise  Get some form of exercise, every day. This will help reduce pain and release endorphins, the  feel good  chemicals in your brain. This is almost as good as taking antidepressants!  This is not the same as joining a gym and then never going! (they count on that by the way ) It can be as simple as just going for a walk or doing some gardening, anything that will get you moving.      Hygiene   Maintain good hygiene (Get out of bed in the morning, Make your bed, Brush your teeth, Take a shower, and Get dressed like you were going to work, even if you are unemployed).  If your clothes don't fit try to get ones that do.    Diet  I will strive to eat foods that are good for me, drink plenty of water, and avoid excessive sugar, caffeine, alcohol, and other mood-altering substances.  Some foods that are helpful in depression are: complex carbohydrates, B vitamins, flaxseed, fish or fish oil, fresh fruits and vegetables.    Psychotherapy  I agree to participate in Individual Therapy (if recommended).    Medication  If prescribed medications, I agree to take them.  Missing doses can result in serious side effects.  I understand that  drinking alcohol, or other illicit drug use, may cause potential side effects.  I will not stop my medication abruptly without first discussing it with my provider.    Staying Connected With Others  I will stay in touch with my friends, family members, and my primary care provider/team.    Use your imagination  Be creative.  We all have a creative side; it doesn t matter if it s oil painting, sand castles, or mud pies! This will also kick up the endorphins.    Witness Beauty  (AKA stop and smell the roses) Take a look outside, even in mid-winter. Notice colors, textures. Watch the squirrels and birds.     Service to others  Be of service to others.  There is always someone else in need.  By helping others we can  get out of ourselves  and remember the really important things.  This also provides opportunities for practicing all the other parts of the program.    Humor  Laugh and be silly!  Adjust your TV habits for less news and crime-drama and more comedy.    Control your stress  Try breathing deep, massage therapy, biofeedback, and meditation. Find time to relax each day.     My support system    Clinic Contact:  Phone number:    Contact 1:  Phone number:    Contact 2:  Phone number:    Faith/:  Phone number:    Therapist:  Phone number:    Local crisis center:    Phone number:    Other community support:  Phone number:

## 2018-03-21 NOTE — PATIENT INSTRUCTIONS
Refilled your Zoloft for 6 months. If you are noticing any worsening changes in your mood at all, don't hesitate to shoot me a message or schedule an evisit to catch up, can potentially increase your dosage.       Recommended drinking more water to stay hydrated and improve eczema rash. You can try using a flavor diffuser, adding sugar free and caffeine free flavors to water can make drinking water more enjoyable.     Water can be your best friend or worst enemy.    If water is not your enemy, shower/bath daily.    NEVER soak in bubble bath, oils, etc.    Keep them to 15-30 minutes in lukewarm (NOT HOT) water.  After shower, pat/blot dry and apply moisturizer within minutes    Products that are advised for eczema include:  Soaps -- Dove, Caress, or Basis (only need in underarms and groin unless dirt noted)  Lotions -- Cera Ve, Cetaphil, Vanicream, Vaseline  Petroleum jelly can be used for extra moisturizer when needed but is greasy.

## 2018-03-22 ASSESSMENT — ANXIETY QUESTIONNAIRES: GAD7 TOTAL SCORE: 8

## 2018-03-22 ASSESSMENT — PATIENT HEALTH QUESTIONNAIRE - PHQ9: SUM OF ALL RESPONSES TO PHQ QUESTIONS 1-9: 7

## 2018-09-06 DIAGNOSIS — F33.1 MAJOR DEPRESSIVE DISORDER, RECURRENT EPISODE, MODERATE (H): ICD-10-CM

## 2018-09-06 DIAGNOSIS — F41.9 ANXIETY: ICD-10-CM

## 2018-09-06 RX ORDER — SERTRALINE HYDROCHLORIDE 100 MG/1
TABLET, FILM COATED ORAL
Qty: 30 TABLET | Refills: 0 | Status: SHIPPED | OUTPATIENT
Start: 2018-09-06 | End: 2018-09-30

## 2018-09-06 NOTE — TELEPHONE ENCOUNTER
"Requested Prescriptions   Pending Prescriptions Disp Refills     sertraline (ZOLOFT) 100 MG tablet [Pharmacy Med Name: SERTRALINE 100MG TAB] 90 tablet 1     Sig: TAKE 1 TABLET (100 MG) BY MOUTH ONCE DAILY    SSRIs Protocol Failed    9/6/2018  9:03 AM       Failed - PHQ-9 score less than 5 in past 6 months    Please review last PHQ-9 score.   PHQ-9 SCORE 2/9/2017 8/28/2017 3/21/2018   Total Score - - -   Total Score MyChart 8 (Mild depression) 7 (Mild depression) 7 (Mild depression)   Total Score - 7 7          Passed - Patient is age 18 or older       Passed - No active pregnancy on record       Passed - No positive pregnancy test in last 12 months       Passed - Recent (6 mo) or future (30 days) visit within the authorizing provider's specialty    Patient had office visit in the last 6 months or has a visit in the next 30 days with authorizing provider or within the authorizing provider's specialty.  See \"Patient Info\" tab in inbasket, or \"Choose Columns\" in Meds & Orders section of the refill encounter.            sertraline (ZOLOFT) 100 MG tablet  Medication is being filled for 1 time refill only due to:  Patient needs to be seen because due for 6 month mood follow up.     Please assist with scheduling.    Kimberly Martinez, RN, BSN         "

## 2018-09-07 NOTE — TELEPHONE ENCOUNTER
LM for patient to return phone call to clinic about message below.  Lorri Spence CMA ([facility])

## 2018-09-21 NOTE — PROGRESS NOTES
SUBJECTIVE:   Rossy Nagel is a 33 year old female who presents to clinic today for the following health issues:    History of Present Illness     Depression & Anxiety Follow-up:     Depression/Anxiety:  Depression & Anxiety    Status since last visit::  Stable    Other associated symptoms of depression and anxiety::  None    Significant life event::  No    Current substance use::  None       Today's PHQ-9         PHQ-9 Total Score:     (P) 6   PHQ-9 Q9 Suicidal ideation:   (P) Not at all   Thoughts of suicide or self harm:      Self-harm Plan:        Self-harm Action:          Safety concerns for self or others:       PENNY-7 Total Score: (P) 8    Diet:  Regular (no restrictions)  Frequency of exercise:  None  Taking medications regularly:  Yes  Medication side effects:  None  Additional concerns today:  YES    Her kids started their first year at public school and has brought illnesses into the house, increasing stress.     Answers for HPI/ROS submitted by the patient on 10/1/2018   PHQ-2 Score: 0  If you checked off any problems, how difficult have these problems made it for you to do your work, take care of things at home, or get along with other people?: Somewhat difficult  PHQ9 TOTAL SCORE: 6  PENNY 7 TOTAL SCORE: 8    Acute Illness   Acute illness concerns: Sinus Problem  Onset: 1 week    Fever: YES- low grade    Chills/Sweats: YES    Headache (location?): YES    Sinus Pressure:YES    Conjunctivitis:  no    Ear Pain: YES: both- worse in right    Rhinorrhea: no     Congestion: YES    Sore Throat: slight     Cough: YES - slight    Wheeze: no     Decreased Appetite: YES    Nausea: no     Vomiting: no     Diarrhea:  no     Dysuria/Freq.: no     Fatigue/Achiness: YES- fatigue    Sick/Strep Exposure: YES     Therapies Tried and outcome: Advil and Benadryl    Dermographia  She has not had any new detergents or soaps which could cause her dermographia. Her skin rash started 2 weeks ago. Notes she gets large welts  with wearing jeans.     Problem list and histories reviewed & adjusted, as indicated.  Additional history: as documented    Patient Active Problem List   Diagnosis     CARDIOVASCULAR SCREENING; LDL GOAL LESS THAN 160     Major depressive disorder, recurrent episode, moderate (H)     Anxiety     IUD (intrauterine device) in place     Past Surgical History:   Procedure Laterality Date     NO HISTORY OF SURGERY         Social History   Substance Use Topics     Smoking status: Never Smoker     Smokeless tobacco: Never Used     Alcohol use Yes      Comment: Rarely     Family History   Problem Relation Age of Onset     Diabetes Mother      Type II     Depression Mother      Obesity Mother      overweight     Hypertension Mother      Hypertension Maternal Grandmother      HEART DISEASE Maternal Grandfather      Bypass     Hypertension Maternal Grandfather      Cancer Paternal Grandmother      ovarian or uterine     Asthma Brother            ROS:  Constitutional, HEENT, cardiovascular, pulmonary, GI, , musculoskeletal, neuro, skin, endocrine and psych systems are negative, except as in HPI or otherwise noted.     This document serves as a record of the services and decisions personally performed and made by Carmita Ramos MD. It was created on her behalf by Lavelle Vazquez, a trained medical scribe. The creation of this document is based the provider's statements to the medical scribe.  Lavelle Vazquez, October 1, 2018 11:17 AM    OBJECTIVE:                                                    /70  Pulse 73  Temp 98.1  F (36.7  C) (Temporal)  Wt 61.2 kg (135 lb)  SpO2 98%  Breastfeeding? No  BMI 25.16 kg/m2  Body mass index is 25.16 kg/(m^2).   GENERAL: healthy, alert, well nourished, well hydrated, no distress  HENT: ear canals- normal; TMs- fluid build-up bilaterally; Nose- congestion worse on left; Mouth- post-nasal drip; minor sinus swelling  NECK: tenderness, anterior adenopathy, no asymmetry, no masses; thyroid-  normal to palpation  RESP: lungs clear to auscultation - no rales, no rhonchi, no wheezes  CV: regular rates and rhythm, normal S1 S2, no S3 or S4 and no murmur, no click or rub -  SKIN: Dermographia  PSYCH: Alert and oriented times 3; speech- coherent , normal rate and volume; able to articulate logical thoughts, able to abstract reason, no tangential thoughts, no hallucinations or delusions, affect- normal    Diagnostic test results:  No results found for this or any previous visit (from the past 24 hour(s)).     ASSESSMENT/PLAN:                                                        ICD-10-CM    1. Anxiety F41.9 sertraline (ZOLOFT) 100 MG tablet   2. Major depressive disorder, recurrent episode, moderate (H) F33.1 sertraline (ZOLOFT) 100 MG tablet   3. Need for prophylactic vaccination and inoculation against influenza Z23 HC FLU VAC PRESRV FREE QUAD SPLIT VIR 3+YRS IM  [24934]   4. Dermographia L50.3 loratadine (CLARITIN) 10 MG tablet     Mood:  Pt doing well on current medication and treatment plan. No change at this time. Refilled Zoloft prescription. Return to clinic in 6 months for mood recheck.    Dermographia:  Recommend using an antihistamine and tracking substances to discover which may worsen symptoms. Prescribed Claritin today.     Acute Illness:  Recommend steam plenty of fluids to encourage draining.    Health Maintenance:  Vaccination(s) administered: Influenza.     The information in this document, created by the medical scribe for me, accurately reflects the services I personally performed and the decisions made by me. I have reviewed and approved this document for accuracy.   MD Carmita Castañeda MD, MD  [facility]

## 2018-10-01 ENCOUNTER — OFFICE VISIT (OUTPATIENT)
Dept: FAMILY MEDICINE | Facility: OTHER | Age: 33
End: 2018-10-01

## 2018-10-01 VITALS
OXYGEN SATURATION: 98 % | TEMPERATURE: 98.1 F | HEART RATE: 73 BPM | WEIGHT: 135 LBS | BODY MASS INDEX: 25.16 KG/M2 | SYSTOLIC BLOOD PRESSURE: 112 MMHG | DIASTOLIC BLOOD PRESSURE: 70 MMHG

## 2018-10-01 DIAGNOSIS — L50.3 DERMOGRAPHIA: ICD-10-CM

## 2018-10-01 DIAGNOSIS — Z23 NEED FOR PROPHYLACTIC VACCINATION AND INOCULATION AGAINST INFLUENZA: ICD-10-CM

## 2018-10-01 DIAGNOSIS — F33.1 MAJOR DEPRESSIVE DISORDER, RECURRENT EPISODE, MODERATE (H): ICD-10-CM

## 2018-10-01 DIAGNOSIS — F41.9 ANXIETY: Primary | ICD-10-CM

## 2018-10-01 PROCEDURE — 99214 OFFICE O/P EST MOD 30 MIN: CPT | Mod: 25 | Performed by: FAMILY MEDICINE

## 2018-10-01 PROCEDURE — 90686 IIV4 VACC NO PRSV 0.5 ML IM: CPT | Performed by: FAMILY MEDICINE

## 2018-10-01 PROCEDURE — 90471 IMMUNIZATION ADMIN: CPT | Performed by: FAMILY MEDICINE

## 2018-10-01 RX ORDER — LORATADINE 10 MG/1
10 TABLET ORAL DAILY
Qty: 90 TABLET | Refills: 1 | Status: SHIPPED | OUTPATIENT
Start: 2018-10-01

## 2018-10-01 RX ORDER — SERTRALINE HYDROCHLORIDE 100 MG/1
TABLET, FILM COATED ORAL
Qty: 90 TABLET | Refills: 1 | Status: SHIPPED | OUTPATIENT
Start: 2018-10-01 | End: 2019-05-23

## 2018-10-01 ASSESSMENT — PATIENT HEALTH QUESTIONNAIRE - PHQ9
10. IF YOU CHECKED OFF ANY PROBLEMS, HOW DIFFICULT HAVE THESE PROBLEMS MADE IT FOR YOU TO DO YOUR WORK, TAKE CARE OF THINGS AT HOME, OR GET ALONG WITH OTHER PEOPLE: SOMEWHAT DIFFICULT
SUM OF ALL RESPONSES TO PHQ QUESTIONS 1-9: 6
SUM OF ALL RESPONSES TO PHQ QUESTIONS 1-9: 6

## 2018-10-01 ASSESSMENT — ANXIETY QUESTIONNAIRES
3. WORRYING TOO MUCH ABOUT DIFFERENT THINGS: SEVERAL DAYS
6. BECOMING EASILY ANNOYED OR IRRITABLE: MORE THAN HALF THE DAYS
2. NOT BEING ABLE TO STOP OR CONTROL WORRYING: SEVERAL DAYS
GAD7 TOTAL SCORE: 8
1. FEELING NERVOUS, ANXIOUS, OR ON EDGE: SEVERAL DAYS
5. BEING SO RESTLESS THAT IT IS HARD TO SIT STILL: SEVERAL DAYS
7. FEELING AFRAID AS IF SOMETHING AWFUL MIGHT HAPPEN: SEVERAL DAYS
4. TROUBLE RELAXING: SEVERAL DAYS
GAD7 TOTAL SCORE: 8
GAD7 TOTAL SCORE: 8
7. FEELING AFRAID AS IF SOMETHING AWFUL MIGHT HAPPEN: SEVERAL DAYS

## 2018-10-01 NOTE — MR AVS SNAPSHOT
After Visit Summary   10/1/2018    Rossy Nagel    MRN: [id number]           Patient Information     Date Of Birth          1985        Visit Information        Provider Department      10/1/2018 11:15 AM Carmita Ramos MD [facility]        Today's Diagnoses     Anxiety    -  1    Major depressive disorder, recurrent episode, moderate (H)        Need for prophylactic vaccination and inoculation against influenza        Dermographia           Follow-ups after your visit        Follow-up notes from your care team     Return in about 6 months (around 4/1/2019), or if symptoms worsen or fail to improve, for Mood Recheck.      Who to contact     If you have questions or need follow up information about today's clinic visit or your schedule please contact [facility] directly at [phone].  Normal or non-critical lab and imaging results will be communicated to you by MyChart, letter or phone within 4 business days after the clinic has received the results. If you do not hear from us within 7 days, please contact the clinic through MyChart or phone. If you have a critical or abnormal lab result, we will notify you by phone as soon as possible.  Submit refill requests through [employer] or call your pharmacy and they will forward the refill request to us. Please allow 3 business days for your refill to be completed.          Additional Information About Your Visit        Care EveryWhere ID     This is your Care EveryWhere ID. This could be used by other organizations to access your [facility] medical records  [id number]        Your Vitals Were     Pulse Temperature Pulse Oximetry Breastfeeding? BMI (Body Mass Index)       73 98.1  F (36.7  C) (Temporal) 98% No 25.16 kg/m2        Blood Pressure from Last 3 Encounters:   10/01/18 112/70   03/21/18 124/70   08/28/17 116/72    Weight from Last 3 Encounters:   10/01/18 61.2 kg (135 lb)   03/21/18 62.1 kg (137 lb)    08/28/17 61.7 kg (136 lb)              We Performed the Following     HC FLU VAC PRESRV FREE QUAD SPLIT VIR 3+YRS IM  [[id number]]          Today's Medication Changes          These changes are accurate as of 10/1/18 11:44 AM.  If you have any questions, ask your nurse or doctor.               Start taking these medicines.        Dose/Directions    loratadine 10 MG tablet   Commonly known as:  CLARITIN   Used for:  Dermographia   Started by:  Carmita Ramos MD        Dose:  10 mg   Take 1 tablet (10 mg) by mouth daily   Quantity:  90 tablet   Refills:  1            Where to get your medicines      These medications were sent to [facility] [address] [facility]  [address] [facility] [address]     Phone:  [phone]     loratadine 10 MG tablet    sertraline 100 MG tablet                Primary Care Provider Office Phone # Fax #    Carmita Ramos [phone]       [address] [place] [id number]        Equal Access to Services     [facility] AH: Hadii aad ku hadasho Soomaali, waaxda luqadaha, qaybta kaalmada adeegyada, waxay idiin hayaan stefany mahlotra . So [facility] [phone].    ATENCIÓN: Si habla español, tiene a mccurdy disposición servicios gratuitos de asistencia lingüística. [facility] [phone].    We comply with applicable federal civil rights laws and [place] laws. We do not discriminate on the basis of race, color, national origin, age, disability, sex, sexual orientation, or gender identity.            Thank you!     Thank you for choosing [facility]  for your care. Our goal is always to provide you with excellent care. Hearing back from our patients is one way we can continue to improve our services. Please take a few minutes to complete the written survey that you may receive in the mail after your visit with us. Thank you!             Your Updated Medication List - Protect others around you: Learn how to safely use, store  and throw away your medicines at www.disposemymeds.org.          This list is accurate as of 10/1/18 11:44 AM.  Always use your most recent med list.                   Brand Name Dispense Instructions for use Diagnosis    loratadine 10 MG tablet    CLARITIN    90 tablet    Take 1 tablet (10 mg) by mouth daily    Dermographia       paragard intrauterine copper      1 each by Intrauterine route once        sertraline 100 MG tablet    ZOLOFT    90 tablet    TAKE 1 TABLET (100 MG) BY MOUTH ONCE DAILY    Anxiety, Major depressive disorder, recurrent episode, moderate (H)

## 2018-10-01 NOTE — PROGRESS NOTES

## 2018-10-02 ASSESSMENT — PATIENT HEALTH QUESTIONNAIRE - PHQ9: SUM OF ALL RESPONSES TO PHQ QUESTIONS 1-9: 6

## 2018-10-02 ASSESSMENT — ANXIETY QUESTIONNAIRES: GAD7 TOTAL SCORE: 8

## 2019-05-30 NOTE — PROGRESS NOTES
Subjective     Rossy Nagel is a 33 year old female who presents to clinic today for the following health issues:    History of Present Illness     Mental Health Follow-up:  Patient presents to follow-up on Depression & Anxiety.Patient's depression since last visit has been:  Medium  The patient is not having other symptoms associated with depression.  Patient's anxiety since last visit has been:  Good  The patient is not having other symptoms associated with anxiety.  Any significant life events: financial concerns  Patient is not feeling anxious or having panic attacks.  Patient has no concerns about alcohol or drug use.     Social History  Tobacco Use    Smoking status: Never Smoker    Smokeless tobacco: Never Used  Alcohol use: Yes    Comment: Rarely  Drug use: No      Today's PHQ-9         PHQ-9 Total Score:     (P) 7   PHQ-9 Q9 Thoughts of better off dead/self-harm past 2 weeks :   (P) Several days   Thoughts of suicide or self harm:  (P) No   Self-harm Plan:        Self-harm Action:          Safety concerns for self or others: (P) No         She eats 0-1 servings of fruits and vegetables daily.She consumes 1 sweetened beverage(s) daily.  She is taking medications regularly.    She has been out of her medication for about a week now. She had one day where she was feeling very emotional, everything was making her upset, even brushing her hair. When she is on the medication she feels fine. She has been trying to eat healthier foods, but struggles with how expensive it is to buy a lot of fresh food, and how easily it goes bad.     Shortness of Breath  She also notes that she has had some episodes of shortness of breath that come on usually in the mornings, and has happened about 3 times a month. This lasts about a half hour before it goes away. She denies any allergy like symptoms associated with this, doesn't have any coughing, itching eyes, sneezing or congestion. She has been having more reflux symptoms  lately.     Answers for HPI/ROS submitted by the patient on 6/5/2019   Chronic problems general questions HPI Form  If you checked off any problems, how difficult have these problems made it for you to do your work, take care of things at home, or get along with other people?: Somewhat difficult  PHQ9 TOTAL SCORE: 7  PENNY 7 TOTAL SCORE: 3    Patient Active Problem List   Diagnosis     CARDIOVASCULAR SCREENING; LDL GOAL LESS THAN 160     Major depressive disorder, recurrent episode, moderate (H)     Anxiety     IUD (intrauterine device) in place     Past Surgical History:   Procedure Laterality Date     NO HISTORY OF SURGERY         Social History     Tobacco Use     Smoking status: Never Smoker     Smokeless tobacco: Never Used   Substance Use Topics     Alcohol use: Yes     Comment: Rarely     Family History   Problem Relation Age of Onset     Diabetes Mother         Type II     Depression Mother      Obesity Mother         overweight     Hypertension Mother      Hypertension Maternal Grandmother      Heart Disease Maternal Grandfather         Bypass     Hypertension Maternal Grandfather      Cancer Paternal Grandmother         ovarian or uterine     Asthma Brother          Current Outpatient Medications   Medication Sig Dispense Refill     loratadine (CLARITIN) 10 MG tablet Take 1 tablet (10 mg) by mouth daily 90 tablet 1     paragard intrauterine copper 1 each by Intrauterine route once       sertraline (ZOLOFT) 100 MG tablet TAKE 1 TABLET (100 MG) BY MOUTH ONCE DAILY 90 tablet 1       Reviewed and updated as needed this visit by Provider       Review of Systems   Constitutional, HEENT, cardiovascular, pulmonary, GI, , musculoskeletal, neuro, skin, endocrine and psych systems are negative, except as in HPI or otherwise noted.     This document serves as a record of the services and decisions personally performed and made by Carmita Ramos MD. It was created on her behalf by Torri Reza, a trained medical scribe. The  "creation of this document is based the provider's statements to the medical scribe.  Torri Reza, June 5, 2019 2:31 PM       Objective    /64 (BP Location: Left arm, Patient Position: Chair, Cuff Size: Adult Large)   Pulse 89   Temp 99  F (37.2  C) (Temporal)   Resp 16   Ht 1.56 m (5' 1.42\")   Wt 67.6 kg (149 lb)   SpO2 98%   BMI 27.77 kg/m    Body mass index is 27.77 kg/m .  Physical Exam   GENERAL: healthy, alert and no distress  HENT: nose and mouth without ulcers or lesions  RESP: lungs clear to auscultation - no rales, rhonchi or wheezes  CV: regular rate and rhythm, normal S1 S2, no S3 or S4, no murmur, click or rub, no peripheral edema and peripheral pulses strong  SKIN: no suspicious lesions or rashes  PSYCH: mentation appears normal, affect normal/bright    Diagnostic Test Results:  Labs reviewed in Epic      Assessment & Plan       ICD-10-CM    1. Major depressive disorder, recurrent episode, moderate (H) F33.1 sertraline (ZOLOFT) 100 MG tablet     NUTRITION REFERRAL   2. Anxiety F41.9 sertraline (ZOLOFT) 100 MG tablet     NUTRITION REFERRAL     Mood: Her symptoms worsened when she was not taking her medications. When she is taking her medications her symptoms are very well controlled. Refilled her medications today and recommended follow up in 6 months or sooner if symptoms worsen or fail to improve. We also discussed healthy living and community resources available for getting fresh fruits and vegetables for reduced cost. I directed her to [employer] for reduced cost foods. A nutrition referral was placed today as well.     Shortness of breath: Her physical exam is clear today. Recommended that she watch for symptoms of acid reflux when this happens and try an over the counter medication to see if that improves it.      BMI:   Estimated body mass index is 27.77 kg/m  as calculated from the following:    Height as of this encounter: 1.56 m (5' 1.42\").    Weight as of this encounter: 67.6 kg " (149 lb).   Weight management plan: Discussed healthy diet and exercise guidelines    Patient Instructions   Restart the 100 mg Zoloft.   Follow up in 6 months for recheck or sooner if symptoms worsen. This can be a phone visit or an e visit on MyChart.     Follow up once a year for blood pressures and weight recheck.       Lifestyle Modifications to Manage Hypertension    Weight reduction to a body mass index of 18.5 to 24.9 will give rise to a systolic blood pressure reduction of  5 to 20 mm Hg.  Your current BMI is bowel movement.    Incorporating the DASH Diet (a diet rich in fruits, vegetables, and low-fat dairy products with a reduced content of saturated and total fat) is as effective as a single drug agent.  This will provide a reduction of  8 to 14 mm Hg    Dietary sodium restriction to no more than 100 mmol per day (2.4 g sodium or 6 g sodium chloride) will provide a reduction of  2 to 8 mm Hg.    Engage in regular aerobic activity such as brisk walking (at least 30 minutes per day, most days of the week). This will provide a reduction of  4 to 9 mm Hg.    Limit alcohol consumption to no more than two drinks (1 oz or 30 mL of alcohol; e.g., 24-oz beer, 10 oz of wine, or 3 oz of 80-proof whiskey) per day in most men and to no more than one drink per day in women and lighter-weight persons. This will provide a reduction of  2 to 4 mm Hg.    If continues to be elevated, we should consider using a low dose of a medication to improve this and lower your risk for heart attack and stroke.        Return in about 6 months (around 12/5/2019) for Mood.    The information in this document, created by the medical scribe for me, accurately reflects the services I personally performed and the decisions made by me. I have reviewed and approved this document for accuracy.     Carmita Ramos MD, MD  [facility]

## 2019-06-05 ENCOUNTER — OFFICE VISIT (OUTPATIENT)
Dept: FAMILY MEDICINE | Facility: OTHER | Age: 34
End: 2019-06-05
Payer: COMMERCIAL

## 2019-06-05 VITALS
RESPIRATION RATE: 16 BRPM | DIASTOLIC BLOOD PRESSURE: 64 MMHG | WEIGHT: 149 LBS | HEART RATE: 89 BPM | TEMPERATURE: 99 F | HEIGHT: 61 IN | SYSTOLIC BLOOD PRESSURE: 112 MMHG | OXYGEN SATURATION: 98 % | BODY MASS INDEX: 28.13 KG/M2

## 2019-06-05 DIAGNOSIS — F41.9 ANXIETY: ICD-10-CM

## 2019-06-05 DIAGNOSIS — F33.1 MAJOR DEPRESSIVE DISORDER, RECURRENT EPISODE, MODERATE (H): Primary | ICD-10-CM

## 2019-06-05 PROCEDURE — 99214 OFFICE O/P EST MOD 30 MIN: CPT | Performed by: FAMILY MEDICINE

## 2019-06-05 RX ORDER — SERTRALINE HYDROCHLORIDE 100 MG/1
TABLET, FILM COATED ORAL
Qty: 90 TABLET | Refills: 1 | Status: SHIPPED | OUTPATIENT
Start: 2019-06-05 | End: 2019-12-28

## 2019-06-05 ASSESSMENT — MIFFLIN-ST. JEOR: SCORE: 1319.85

## 2019-06-05 ASSESSMENT — ANXIETY QUESTIONNAIRES
GAD7 TOTAL SCORE: 3
4. TROUBLE RELAXING: NOT AT ALL
7. FEELING AFRAID AS IF SOMETHING AWFUL MIGHT HAPPEN: SEVERAL DAYS
7. FEELING AFRAID AS IF SOMETHING AWFUL MIGHT HAPPEN: SEVERAL DAYS
5. BEING SO RESTLESS THAT IT IS HARD TO SIT STILL: NOT AT ALL
1. FEELING NERVOUS, ANXIOUS, OR ON EDGE: SEVERAL DAYS
GAD7 TOTAL SCORE: 3
6. BECOMING EASILY ANNOYED OR IRRITABLE: SEVERAL DAYS
2. NOT BEING ABLE TO STOP OR CONTROL WORRYING: NOT AT ALL
GAD7 TOTAL SCORE: 3
3. WORRYING TOO MUCH ABOUT DIFFERENT THINGS: NOT AT ALL

## 2019-06-05 ASSESSMENT — PATIENT HEALTH QUESTIONNAIRE - PHQ9
SUM OF ALL RESPONSES TO PHQ QUESTIONS 1-9: 7
SUM OF ALL RESPONSES TO PHQ QUESTIONS 1-9: 7
10. IF YOU CHECKED OFF ANY PROBLEMS, HOW DIFFICULT HAVE THESE PROBLEMS MADE IT FOR YOU TO DO YOUR WORK, TAKE CARE OF THINGS AT HOME, OR GET ALONG WITH OTHER PEOPLE: SOMEWHAT DIFFICULT

## 2019-06-05 NOTE — PATIENT INSTRUCTIONS
Restart the 100 mg Zoloft.   Follow up in 6 months for recheck or sooner if symptoms worsen. This can be a phone visit or an e visit on MyChart.     Follow up once a year for blood pressures and weight recheck.       Lifestyle Modifications to Manage Hypertension    Weight reduction to a body mass index of 18.5 to 24.9 will give rise to a systolic blood pressure reduction of  5 to 20 mm Hg.  Your current BMI is bowel movement.    Incorporating the DASH Diet (a diet rich in fruits, vegetables, and low-fat dairy products with a reduced content of saturated and total fat) is as effective as a single drug agent.  This will provide a reduction of  8 to 14 mm Hg    Dietary sodium restriction to no more than 100 mmol per day (2.4 g sodium or 6 g sodium chloride) will provide a reduction of  2 to 8 mm Hg.    Engage in regular aerobic activity such as brisk walking (at least 30 minutes per day, most days of the week). This will provide a reduction of  4 to 9 mm Hg.    Limit alcohol consumption to no more than two drinks (1 oz or 30 mL of alcohol; e.g., 24-oz beer, 10 oz of wine, or 3 oz of 80-proof whiskey) per day in most men and to no more than one drink per day in women and lighter-weight persons. This will provide a reduction of  2 to 4 mm Hg.    If continues to be elevated, we should consider using a low dose of a medication to improve this and lower your risk for heart attack and stroke.

## 2019-06-06 ASSESSMENT — ANXIETY QUESTIONNAIRES: GAD7 TOTAL SCORE: 3

## 2019-06-06 ASSESSMENT — PATIENT HEALTH QUESTIONNAIRE - PHQ9: SUM OF ALL RESPONSES TO PHQ QUESTIONS 1-9: 7

## 2019-10-31 ENCOUNTER — ALLIED HEALTH/NURSE VISIT (OUTPATIENT)
Dept: FAMILY MEDICINE | Facility: CLINIC | Age: 34
End: 2019-10-31

## 2019-10-31 DIAGNOSIS — Z23 NEED FOR PROPHYLACTIC VACCINATION AND INOCULATION AGAINST INFLUENZA: Primary | ICD-10-CM

## 2019-10-31 PROCEDURE — 99207 ZZC NO CHARGE NURSE ONLY: CPT

## 2019-10-31 PROCEDURE — 90686 IIV4 VACC NO PRSV 0.5 ML IM: CPT

## 2019-10-31 PROCEDURE — 90471 IMMUNIZATION ADMIN: CPT

## 2019-11-08 ENCOUNTER — HEALTH MAINTENANCE LETTER (OUTPATIENT)
Age: 34
End: 2019-11-08

## 2019-11-15 ENCOUNTER — E-VISIT (OUTPATIENT)
Dept: FAMILY MEDICINE | Facility: OTHER | Age: 34
End: 2019-11-15
Payer: COMMERCIAL

## 2019-11-15 DIAGNOSIS — N30.01 ACUTE CYSTITIS WITH HEMATURIA: ICD-10-CM

## 2019-11-15 DIAGNOSIS — R82.90 NONSPECIFIC FINDING ON EXAMINATION OF URINE: Primary | ICD-10-CM

## 2019-11-15 DIAGNOSIS — N30.01 ACUTE CYSTITIS WITH HEMATURIA: Primary | ICD-10-CM

## 2019-11-15 LAB
ALBUMIN UR-MCNC: 100 MG/DL
APPEARANCE UR: ABNORMAL
BACTERIA #/AREA URNS HPF: ABNORMAL /HPF
BILIRUB UR QL STRIP: ABNORMAL
COLOR UR AUTO: ABNORMAL
GLUCOSE UR STRIP-MCNC: NEGATIVE MG/DL
HGB UR QL STRIP: ABNORMAL
KETONES UR STRIP-MCNC: NEGATIVE MG/DL
LEUKOCYTE ESTERASE UR QL STRIP: ABNORMAL
MUCOUS THREADS #/AREA URNS LPF: PRESENT /LPF
NITRATE UR QL: NEGATIVE
PH UR STRIP: 5.5 PH (ref 5–7)
RBC #/AREA URNS AUTO: ABNORMAL /HPF
SOURCE: ABNORMAL
SP GR UR STRIP: >1.03 (ref 1–1.03)
UROBILINOGEN UR STRIP-ACNC: 0.2 EU/DL (ref 0.2–1)
WBC #/AREA URNS AUTO: ABNORMAL /HPF

## 2019-11-15 PROCEDURE — 87086 URINE CULTURE/COLONY COUNT: CPT | Performed by: FAMILY MEDICINE

## 2019-11-15 PROCEDURE — 99444 ZZC PHYSICIAN ONLINE EVALUATION & MANAGEMENT SERVICE: CPT | Performed by: FAMILY MEDICINE

## 2019-11-15 PROCEDURE — 81001 URINALYSIS AUTO W/SCOPE: CPT | Performed by: FAMILY MEDICINE

## 2019-11-15 RX ORDER — NITROFURANTOIN 25; 75 MG/1; MG/1
100 CAPSULE ORAL 2 TIMES DAILY
Qty: 14 CAPSULE | Refills: 0 | Status: SHIPPED | OUTPATIENT
Start: 2019-11-15 | End: 2020-01-28

## 2019-11-16 LAB
BACTERIA SPEC CULT: NORMAL
Lab: NORMAL
SPECIMEN SOURCE: NORMAL

## 2019-11-18 NOTE — RESULT ENCOUNTER NOTE
Rossy, your results show not enough growth for sensitivities. Hopefully you are feeling better.  Please let me know if you have any questions.  Carmita Ramos MD

## 2019-12-28 ENCOUNTER — MYC REFILL (OUTPATIENT)
Dept: FAMILY MEDICINE | Facility: OTHER | Age: 34
End: 2019-12-28

## 2019-12-28 DIAGNOSIS — F33.1 MAJOR DEPRESSIVE DISORDER, RECURRENT EPISODE, MODERATE (H): ICD-10-CM

## 2019-12-28 DIAGNOSIS — F41.9 ANXIETY: ICD-10-CM

## 2019-12-30 ENCOUNTER — E-VISIT (OUTPATIENT)
Dept: FAMILY MEDICINE | Facility: OTHER | Age: 34
End: 2019-12-30
Payer: COMMERCIAL

## 2019-12-30 DIAGNOSIS — F33.1 MAJOR DEPRESSIVE DISORDER, RECURRENT EPISODE, MODERATE (H): ICD-10-CM

## 2019-12-30 DIAGNOSIS — F41.9 ANXIETY: ICD-10-CM

## 2019-12-30 PROCEDURE — 99444 ZZC PHYSICIAN ONLINE EVALUATION & MANAGEMENT SERVICE: CPT | Performed by: FAMILY MEDICINE

## 2019-12-30 RX ORDER — SERTRALINE HYDROCHLORIDE 100 MG/1
TABLET, FILM COATED ORAL
Qty: 30 TABLET | Refills: 0 | Status: SHIPPED | OUTPATIENT
Start: 2019-12-30 | End: 2019-12-31

## 2019-12-30 ASSESSMENT — ANXIETY QUESTIONNAIRES
3. WORRYING TOO MUCH ABOUT DIFFERENT THINGS: NOT AT ALL
GAD7 TOTAL SCORE: 2
1. FEELING NERVOUS, ANXIOUS, OR ON EDGE: SEVERAL DAYS
7. FEELING AFRAID AS IF SOMETHING AWFUL MIGHT HAPPEN: NOT AT ALL
4. TROUBLE RELAXING: NOT AT ALL
GAD7 TOTAL SCORE: 2
7. FEELING AFRAID AS IF SOMETHING AWFUL MIGHT HAPPEN: NOT AT ALL
2. NOT BEING ABLE TO STOP OR CONTROL WORRYING: NOT AT ALL
5. BEING SO RESTLESS THAT IT IS HARD TO SIT STILL: NOT AT ALL
GAD7 TOTAL SCORE: 2
6. BECOMING EASILY ANNOYED OR IRRITABLE: SEVERAL DAYS

## 2019-12-30 ASSESSMENT — PATIENT HEALTH QUESTIONNAIRE - PHQ9
SUM OF ALL RESPONSES TO PHQ QUESTIONS 1-9: 4
SUM OF ALL RESPONSES TO PHQ QUESTIONS 1-9: 4
10. IF YOU CHECKED OFF ANY PROBLEMS, HOW DIFFICULT HAVE THESE PROBLEMS MADE IT FOR YOU TO DO YOUR WORK, TAKE CARE OF THINGS AT HOME, OR GET ALONG WITH OTHER PEOPLE: SOMEWHAT DIFFICULT

## 2019-12-31 RX ORDER — SERTRALINE HYDROCHLORIDE 100 MG/1
TABLET, FILM COATED ORAL
Qty: 90 TABLET | Refills: 1 | Status: SHIPPED | OUTPATIENT
Start: 2019-12-31 | End: 2020-08-04

## 2019-12-31 ASSESSMENT — PATIENT HEALTH QUESTIONNAIRE - PHQ9: SUM OF ALL RESPONSES TO PHQ QUESTIONS 1-9: 4

## 2019-12-31 ASSESSMENT — ANXIETY QUESTIONNAIRES: GAD7 TOTAL SCORE: 2

## 2019-12-31 NOTE — TELEPHONE ENCOUNTER
Medication is being filled for 1 time refill only due to:  mood visit   Note sent on sig    Chavez Bearden, RN, BSN

## 2020-01-28 ENCOUNTER — E-VISIT (OUTPATIENT)
Dept: FAMILY MEDICINE | Facility: OTHER | Age: 35
End: 2020-01-28
Payer: COMMERCIAL

## 2020-01-28 DIAGNOSIS — N30.01 ACUTE CYSTITIS WITH HEMATURIA: ICD-10-CM

## 2020-01-28 DIAGNOSIS — N30.01 ACUTE CYSTITIS WITH HEMATURIA: Primary | ICD-10-CM

## 2020-01-28 DIAGNOSIS — R82.90 NONSPECIFIC FINDING ON EXAMINATION OF URINE: Primary | ICD-10-CM

## 2020-01-28 LAB
ALBUMIN UR-MCNC: NEGATIVE MG/DL
APPEARANCE UR: CLEAR
BACTERIA #/AREA URNS HPF: ABNORMAL /HPF
BILIRUB UR QL STRIP: NEGATIVE
COLOR UR AUTO: YELLOW
GLUCOSE UR STRIP-MCNC: NEGATIVE MG/DL
HGB UR QL STRIP: ABNORMAL
KETONES UR STRIP-MCNC: NEGATIVE MG/DL
LEUKOCYTE ESTERASE UR QL STRIP: ABNORMAL
NITRATE UR QL: NEGATIVE
NON-SQ EPI CELLS #/AREA URNS LPF: ABNORMAL /LPF
PH UR STRIP: 6.5 PH (ref 5–7)
RBC #/AREA URNS AUTO: ABNORMAL /HPF
SOURCE: ABNORMAL
SP GR UR STRIP: 1.02 (ref 1–1.03)
UROBILINOGEN UR STRIP-ACNC: 0.2 EU/DL (ref 0.2–1)
WBC #/AREA URNS AUTO: ABNORMAL /HPF
WBC CLUMPS #/AREA URNS HPF: PRESENT /HPF

## 2020-01-28 PROCEDURE — 99207 ZZC NO BILLABLE SERVICE THIS VISIT: CPT | Performed by: FAMILY MEDICINE

## 2020-01-28 PROCEDURE — 87086 URINE CULTURE/COLONY COUNT: CPT | Performed by: FAMILY MEDICINE

## 2020-01-28 PROCEDURE — 81001 URINALYSIS AUTO W/SCOPE: CPT | Performed by: FAMILY MEDICINE

## 2020-01-28 RX ORDER — NITROFURANTOIN 25; 75 MG/1; MG/1
100 CAPSULE ORAL 2 TIMES DAILY
Qty: 14 CAPSULE | Refills: 0 | Status: SHIPPED | OUTPATIENT
Start: 2020-01-28 | End: 2021-08-26

## 2020-01-28 NOTE — PATIENT INSTRUCTIONS
Thank you for choosing us for your care. I have placed an order for a prescription so that you can start treatment. View your full visit summary for details by clicking on the link below. Your pharmacist will able to address any questions you may have about the medication.     If you re not feeling better within 2-3 days, please schedule an appointment.  You can schedule an appointment right here in [facility], or call [phone]  If the visit is for the same symptoms as your e-visit, we ll refund the cost of your e-visit if seen within seven days.    Thank you for choosing us for your care. Given your symptoms, I would like you to do a lab-only visit to determine what is causing them.  I have placed the orders.  Please schedule an appointment with the lab right here in [facility], or call [phone].  I will let you know when the results are back and next steps to take.    Urinary Tract Infections in Women    Urinary tract infections (UTIs) are most often caused by bacteria. These bacteria enter the urinary tract. The bacteria may come from outside the body. Or they may travel from the skin outside the rectum or vagina into the urethra. Female anatomy makes it easy for bacteria from the bowel to enter a woman s urinary tract, which is the most common source of UTI. This means women develop UTIs more often than men. Pain in or around the urinary tract is a common UTI symptom. But the only way to know for sure if you have a UTI for the healthcare provider to test your urine. The two tests that may be done are the urinalysis and urine culture.  Types of UTIs    Cystitis. A bladder infection (cystitis) is the most common UTI in women. You may have urgent or frequent urination. You may also have pain, burning when you urinate, and bloody urine.    Urethritis. This is an inflamed urethra, which is the tube that carries urine from the bladder to outside the body. You may have lower stomach or back pain. You may also have  urgent or frequent urination.    Pyelonephritis. This is a kidney infection. If not treated, it can be serious and damage your kidneys. In severe cases, you may need to stay in the hospital. You may have a fever and lower back pain.  Medicines to treat a UTI  Most UTIs are treated with antibiotics. These kill the bacteria. The length of time you need to take them depends on the type of infection. It may be as short as 3 days. If you have repeated UTIs, you may need a low-dose antibiotic for several months. Take antibiotics exactly as directed. Don t stop taking them until all of the medicine is gone. If you stop taking the antibiotic too soon, the infection may not go away. You may also develop a resistance to the antibiotic. This can make it much harder to treat.  Lifestyle changes to treat and prevent UTIs  The lifestyle changes below will help get rid of your UTI. They may also help prevent future UTIs.    Drink plenty of fluids. This includes water, juice, or other caffeine-free drinks. Fluids help flush bacteria out of your body.    Empty your bladder. Always empty your bladder when you feel the urge to urinate. And always urinate before going to sleep. Urine that stays in your bladder can lead to infection. Try to urinate before and after sex as well.    Practice good personal hygiene. Wipe yourself from front to back after using the toilet. This helps keep bacteria from getting into the urethra.    Use condoms during sex. These help prevent UTIs caused by sexually transmitted bacteria. Also don't use spermicides during sex. These can increase the risk for UTIs. Choose other forms of birth control instead. For women who tend to get UTIs after sex, a low-dose of a preventive antibiotic may be used. Be sure to discuss this option with your healthcare provider.    Follow up with your healthcare provider as directed. He or she may test to make sure the infection has cleared. If needed, more treatment may be  started.  Date Last Reviewed: 1/1/2017 2000-2019 [employer]. [address] [place] [address], [place] [address]. All rights reserved. This information is not intended as a substitute for professional medical care. Always follow your healthcare professional's instructions.

## 2020-01-29 LAB
BACTERIA SPEC CULT: NORMAL
Lab: NORMAL
SPECIMEN SOURCE: NORMAL

## 2020-01-30 NOTE — RESULT ENCOUNTER NOTE
Rossy, your results show only skin bacteria in the culture. If you are not feeling better, we would need to repeat this culture.  Please let me know if you have any questions.  Carmita Rmaos MD

## 2020-08-04 ENCOUNTER — MYC REFILL (OUTPATIENT)
Dept: FAMILY MEDICINE | Facility: OTHER | Age: 35
End: 2020-08-04

## 2020-08-04 ENCOUNTER — E-VISIT (OUTPATIENT)
Dept: FAMILY MEDICINE | Facility: OTHER | Age: 35
End: 2020-08-04
Payer: COMMERCIAL

## 2020-08-04 DIAGNOSIS — F41.9 ANXIETY: ICD-10-CM

## 2020-08-04 DIAGNOSIS — F33.1 MAJOR DEPRESSIVE DISORDER, RECURRENT EPISODE, MODERATE (H): ICD-10-CM

## 2020-08-04 PROCEDURE — 99421 OL DIG E/M SVC 5-10 MIN: CPT | Performed by: FAMILY MEDICINE

## 2020-08-04 RX ORDER — SERTRALINE HYDROCHLORIDE 100 MG/1
TABLET, FILM COATED ORAL
Qty: 90 TABLET | Refills: 1 | Status: SHIPPED | OUTPATIENT
Start: 2020-08-04 | End: 2021-02-16

## 2020-08-04 RX ORDER — SERTRALINE HYDROCHLORIDE 100 MG/1
TABLET, FILM COATED ORAL
Qty: 90 TABLET | Refills: 1 | Status: CANCELLED | OUTPATIENT
Start: 2020-08-04

## 2020-08-04 ASSESSMENT — ANXIETY QUESTIONNAIRES
3. WORRYING TOO MUCH ABOUT DIFFERENT THINGS: SEVERAL DAYS
4. TROUBLE RELAXING: NOT AT ALL
GAD7 TOTAL SCORE: 4
7. FEELING AFRAID AS IF SOMETHING AWFUL MIGHT HAPPEN: NOT AT ALL
5. BEING SO RESTLESS THAT IT IS HARD TO SIT STILL: NOT AT ALL
6. BECOMING EASILY ANNOYED OR IRRITABLE: SEVERAL DAYS
7. FEELING AFRAID AS IF SOMETHING AWFUL MIGHT HAPPEN: NOT AT ALL
2. NOT BEING ABLE TO STOP OR CONTROL WORRYING: SEVERAL DAYS
GAD7 TOTAL SCORE: 4
1. FEELING NERVOUS, ANXIOUS, OR ON EDGE: SEVERAL DAYS
GAD7 TOTAL SCORE: 4

## 2020-08-04 ASSESSMENT — PATIENT HEALTH QUESTIONNAIRE - PHQ9
SUM OF ALL RESPONSES TO PHQ QUESTIONS 1-9: 4
10. IF YOU CHECKED OFF ANY PROBLEMS, HOW DIFFICULT HAVE THESE PROBLEMS MADE IT FOR YOU TO DO YOUR WORK, TAKE CARE OF THINGS AT HOME, OR GET ALONG WITH OTHER PEOPLE: SOMEWHAT DIFFICULT
SUM OF ALL RESPONSES TO PHQ QUESTIONS 1-9: 4

## 2020-08-05 ASSESSMENT — ANXIETY QUESTIONNAIRES: GAD7 TOTAL SCORE: 4

## 2020-08-05 ASSESSMENT — PATIENT HEALTH QUESTIONNAIRE - PHQ9: SUM OF ALL RESPONSES TO PHQ QUESTIONS 1-9: 4

## 2020-12-06 ENCOUNTER — HEALTH MAINTENANCE LETTER (OUTPATIENT)
Age: 35
End: 2020-12-06

## 2021-02-15 DIAGNOSIS — F33.1 MAJOR DEPRESSIVE DISORDER, RECURRENT EPISODE, MODERATE (H): ICD-10-CM

## 2021-02-15 DIAGNOSIS — F41.9 ANXIETY: ICD-10-CM

## 2021-02-16 ENCOUNTER — E-VISIT (OUTPATIENT)
Dept: FAMILY MEDICINE | Facility: OTHER | Age: 36
End: 2021-02-16
Payer: COMMERCIAL

## 2021-02-16 DIAGNOSIS — F41.9 ANXIETY: ICD-10-CM

## 2021-02-16 DIAGNOSIS — F33.1 MAJOR DEPRESSIVE DISORDER, RECURRENT EPISODE, MODERATE (H): Primary | ICD-10-CM

## 2021-02-16 PROCEDURE — 99421 OL DIG E/M SVC 5-10 MIN: CPT | Performed by: FAMILY MEDICINE

## 2021-02-16 RX ORDER — SERTRALINE HYDROCHLORIDE 100 MG/1
TABLET, FILM COATED ORAL
Qty: 90 TABLET | Refills: 0 | Status: SHIPPED | OUTPATIENT
Start: 2021-02-16 | End: 2021-05-19

## 2021-02-16 ASSESSMENT — ANXIETY QUESTIONNAIRES
7. FEELING AFRAID AS IF SOMETHING AWFUL MIGHT HAPPEN: SEVERAL DAYS
5. BEING SO RESTLESS THAT IT IS HARD TO SIT STILL: NOT AT ALL
GAD7 TOTAL SCORE: 7
7. FEELING AFRAID AS IF SOMETHING AWFUL MIGHT HAPPEN: SEVERAL DAYS
GAD7 TOTAL SCORE: 7
2. NOT BEING ABLE TO STOP OR CONTROL WORRYING: SEVERAL DAYS
6. BECOMING EASILY ANNOYED OR IRRITABLE: MORE THAN HALF THE DAYS
GAD7 TOTAL SCORE: 7
1. FEELING NERVOUS, ANXIOUS, OR ON EDGE: SEVERAL DAYS
3. WORRYING TOO MUCH ABOUT DIFFERENT THINGS: SEVERAL DAYS
4. TROUBLE RELAXING: SEVERAL DAYS

## 2021-02-16 ASSESSMENT — PATIENT HEALTH QUESTIONNAIRE - PHQ9
SUM OF ALL RESPONSES TO PHQ QUESTIONS 1-9: 6
SUM OF ALL RESPONSES TO PHQ QUESTIONS 1-9: 6
10. IF YOU CHECKED OFF ANY PROBLEMS, HOW DIFFICULT HAVE THESE PROBLEMS MADE IT FOR YOU TO DO YOUR WORK, TAKE CARE OF THINGS AT HOME, OR GET ALONG WITH OTHER PEOPLE: SOMEWHAT DIFFICULT

## 2021-02-16 NOTE — TELEPHONE ENCOUNTER
Pending Prescriptions:                       Disp   Refills    sertraline (ZOLOFT) 100 MG tablet [Pharma*90 tab*0            Sig: TAKE 1 TABLET (100MG) BY MOUTH ONCE DAILY.  NEED            APPT  FOR  FURTHER  REFILLS    Medication is being filled for 1 time caroline refill only due to:  Patient is due for mood follow up    Please call and help schedule.  Thank you!

## 2021-02-17 ASSESSMENT — ANXIETY QUESTIONNAIRES: GAD7 TOTAL SCORE: 7

## 2021-02-17 ASSESSMENT — PATIENT HEALTH QUESTIONNAIRE - PHQ9: SUM OF ALL RESPONSES TO PHQ QUESTIONS 1-9: 6

## 2021-04-27 ENCOUNTER — VIRTUAL VISIT (OUTPATIENT)
Dept: PSYCHOLOGY | Facility: CLINIC | Age: 36
End: 2021-04-27
Attending: FAMILY MEDICINE
Payer: COMMERCIAL

## 2021-04-27 DIAGNOSIS — F41.1 GENERALIZED ANXIETY DISORDER: Primary | ICD-10-CM

## 2021-04-27 PROCEDURE — 90834 PSYTX W PT 45 MINUTES: CPT | Mod: 95 | Performed by: COUNSELOR

## 2021-04-27 ASSESSMENT — COLUMBIA-SUICIDE SEVERITY RATING SCALE - C-SSRS
1. IN THE PAST MONTH, HAVE YOU WISHED YOU WERE DEAD OR WISHED YOU COULD GO TO SLEEP AND NOT WAKE UP?: YES
TOTAL  NUMBER OF INTERRUPTED ATTEMPTS LIFETIME: NO
5. HAVE YOU STARTED TO WORK OUT OR WORKED OUT THE DETAILS OF HOW TO KILL YOURSELF? DO YOU INTEND TO CARRY OUT THIS PLAN?: NO
5. HAVE YOU STARTED TO WORK OUT OR WORKED OUT THE DETAILS OF HOW TO KILL YOURSELF? DO YOU INTEND TO CARRY OUT THIS PLAN?: NO
TOTAL  NUMBER OF ABORTED OR SELF INTERRUPTED ATTEMPTS PAST LIFETIME: NO
6. HAVE YOU EVER DONE ANYTHING, STARTED TO DO ANYTHING, OR PREPARED TO DO ANYTHING TO END YOUR LIFE?: NO
2. HAVE YOU ACTUALLY HAD ANY THOUGHTS OF KILLING YOURSELF LIFETIME?: YES
4. HAVE YOU HAD THESE THOUGHTS AND HAD SOME INTENTION OF ACTING ON THEM?: NO
2. HAVE YOU ACTUALLY HAD ANY THOUGHTS OF KILLING YOURSELF?: NO
TOTAL  NUMBER OF INTERRUPTED ATTEMPTS PAST 3 MONTHS: NO
3. HAVE YOU BEEN THINKING ABOUT HOW YOU MIGHT KILL YOURSELF?: YES
ATTEMPT LIFETIME: NO
TOTAL  NUMBER OF ABORTED OR SELF INTERRUPTED ATTEMPTS PAST 3 MONTHS: NO
REASONS FOR IDEATION LIFETIME: COMPLETELY TO END OR STOP THE PAIN (YOU COULDN'T GO ON LIVING WITH THE PAIN OR HOW YOU WERE FEELING)
1. IN THE PAST MONTH, HAVE YOU WISHED YOU WERE DEAD OR WISHED YOU COULD GO TO SLEEP AND NOT WAKE UP?: NO
4. HAVE YOU HAD THESE THOUGHTS AND HAD SOME INTENTION OF ACTING ON THEM?: NO
ATTEMPT PAST THREE MONTHS: NO

## 2021-04-27 NOTE — PROGRESS NOTES
Name: Rossy Nagel  YOB: 1985  Date: April 27, 2021   My primary care provider: Carmita Ramos  My primary care clinic: [facility]  My prescriber: Dr. Carmita Ramos  Other care team support:  therapist   My Triggers:  relationship conflict, work, home environment, limited amount of peer relationships     Additional People, Places, and Things that I can access for support: , my friends       What is important to me and makes life worth living:   and children .         GREEN    Good Control  1. I feel good  2. No suicidal thoughts   3. Can work, sleep and play      Action Steps  1. Self-care: balanced meals, exercising, sleep practices, etc.  2. Take your medications as prescribed.  3. Continue meetings with therapist and prescriber.  4.  Do the healthy things that I enjoy.  5. Not over think           YELLOW  Getting Worse  I have ANY of these:  1. I do not feel good  2. Difficulty Concentrating  3. Sleep is changing  4. Increase/Change in my thoughts to hurt self and/or others, but I can still manage and not act on it.   5. Not taking care of self.  6. Loss of interest in hobbies             Action Steps (in addition to the above):  1. Inform your therapist and psychiatric prescriber/PCP.  2. Keep taking your medications as prescribed.    3. Turn to people you can ask for help.  4. Use internal coping strategies -see below.  5. Create safe environment: notify friends/family of increase in symptoms  6. Read            RED  Get Help  If I have ANY of these:  1. Current and uncontrollable thoughts and/or behaviors to hurt self and/or others.   2. Thoughts of self-harm   Actions to manage my safety  1. Contact your emergency person   2. Call my crisis team- [facility] [phone]  3. Or Call 911 or go to the emergency room right away  4.  Hug my dog!        My Internal Coping Strategies include the following:  take a bath, blow bubbles, belly breathing, arts and crafts,  color, chew gum, fidget toys, play with my pet, use my coping box, exercise and use my coping skills    [End for Brief Safety Plan]     Safety Concerns  How To Identify Situations That Make Your Mental Health Worse:  Triggers are things that make your mental health worse.  Look at the list below to help you find your triggers and what you can do about them.     1. Identify Early Warning Signs:    Sometimes symptoms return, even when people do their best to stay well. Symptoms can develop over a short period of time with little or no warning, but most of the time they emerge gradually over several weeks.  Early warning signs are changes that people experience when a relapse is starting. Some early warning signs are common and others are not as common.   Common Early Warning Signs:    Feeling tense or nervous, Trouble sleeping -either too much or too little sleep, Feeling depressed or low, Feeling irritable, Feeling like not being around other people, Trouble concentrating and Urges to harm self     2. Identify action steps to take when warning signs are noticed:    Taking Action- It is important to take action if you are experiencing early warning signs of a relapse.  The faster you act, the more likely it is that you can avoid a full relapse.  It is helpful to identify several specific ways to cope with symptoms.      The following is my list of symptoms and coping strategies that I can use when they are present:    Symptom Coping Strategies   Anxiety -Talk with someone in your support system and let him or her know how you are feeling.  -Use relaxation techniques such as deep breathing or imagery.  -Use positive affirmations to counteract negative self-talk such as  I am learning to let go of worry.    Depression - Schedule your day; include activities you have to do and activities you enjoy doing.  - Get some exercise - walk, run, bike, or swim.  - Give yourself credit for even the smallest things you get done.    Sleep Difficulties   - Go to sleep at the same time every day.  - Do something relaxing before bed, such as drinking herbal tea or listening to music.  - Avoid having discussions about upsetting topics before going to bed.   Delusions   - Distract yourself from the disturbing thought by doing something that requires your attention such as a puzzle.  - Check out your beliefs by talking to someone you trust.    Hallucinations   - Use headphones to listen to music.  - Tell voices to  stop  or say to yourself,  I am safe.   - Ignore the hallucinations as much as possible; focus on other things.   Concentration Difficulties - Minimize distractions so there is only one thing for you to focus on at a time.    - Ask the person you are having a conversation with to slow down or repeat things you are unsure of.

## 2021-04-27 NOTE — PROGRESS NOTES
Progress Note    Patient Name: Rossy Nagel  Date: 2021         Service Type: Individual      Session Start Time: 110  Session End Time: 1148     Session Length: 47 minutes    Session #: 1    Attendees: Client attended alone    Service Modality:  Video Visit:      Provider verified identity through the following two step process.  Patient provided:  Patient     Telemedicine Visit: The patient's condition can be safely assessed and treated via synchronous audio and visual telemedicine encounter.      Reason for Telemedicine Visit: Services only offered telehealth    Originating Site (Patient Location): Patient's home    Distant Site (Provider Location): Provider Remote Setting    Consent:  The patient/guardian has verbally consented to: the potential risks and benefits of telemedicine (video visit) versus in person care; bill my insurance or make self-payment for services provided; and responsibility for payment of non-covered services.     Patient would like the video invitation sent by:  Send to e-mail at: [email or web address]    Mode of Communication:  Video Conference via [employer]    As the provider I attest to compliance with applicable laws and regulations related to telemedicine.     Treatment Plan Last Reviewed: ADILIA  PHQ-9 / PENNY-7 : 2021    DATA  Interactive Complexity: No  Crisis: No       Progress Since Last Session (Related to Symptoms / Goals / Homework):   Symptoms: first session.  She endorsed symptoms of depression and anxiety.    Homework: first session NA      Episode of Care Goals: Minimal progress - PREPARATION (Decided to change - considering how); Intervened by negotiating a change plan and determining options / strategies for behavior change, identifying triggers, exploring social supports, and working towards setting a date to begin behavior change     Current / Ongoing Stressors and Concerns:   Current-work, relationships, mental health  symptoms    Writer and patient reviewed consent of treatment, informed consent (mandated reporting), attendance policy, and therapy expectations. They appeared to understand and provided consent of treatment. Patient completed the necessary intake assessments and writer started gathering back ground information for the diagnostic assessment.  She denied any suicidal ideation and SIB. She reported last engaging in SIB (cutting) 5 years ago and last experienced SI 8 years ago. Writer and patient completed a safety plan.    Ongoing-work, relationships, mental health symptoms       Treatment Objective(s) Addressed in This Session:   Build rapport and complete intake     Intervention:   Motivational Interviewing    MI Intervention: Expressed Empathy/Understanding, Supported Autonomy, Collaboration, Evocation, Permission to raise concern or advise, Open-ended questions, Reflections: simple and complex, Change talk (evoked) and Reframe     Change Talk Expressed by the Patient: Desire to change    Provider Response to Change Talk: E - Evoked more info from patient about behavior change, A - Affirmed patient's thoughts, decisions, or attempts at behavior change, R - Reflected patient's change talk and S - Summarized patient's change talk statements          ASSESSMENT: Current Emotional / Mental Status (status of significant symptoms):   Risk status (Self / Other harm or suicidal ideation)   Patient denies current fears or concerns for personal safety.   Patient denies current or recent suicidal ideation or behaviors.   Patient denies current or recent homicidal ideation or behaviors.   Patient denies current or recent self injurious behavior or ideation.   Patient denies other safety concerns.   Patient reports there has been no change in risk factors since their last session.     Patient reports there has been no change in protective factors since their last session.     A safety and risk management plan has been developed  including: Patient consented to co-developed safety plan.  Safety and risk management plan was completed.  Patient agreed to use safety plan should any safety concerns arise.  A copy was given to the patient.     Appearance:   Appropriate    Eye Contact:   Fair    Psychomotor Behavior: Normal    Attitude:   Cooperative  Friendly   Orientation:   All   Speech    Rate / Production: Normal     Volume:  Normal    Mood:    Normal   Affect:    Appropriate    Thought Content:  Clear    Thought Form:  Coherent  Logical    Insight:    Fair      Medication Review:   No changes to current psychiatric medication(s)     Medication Compliance:   Yes     Changes in Health Issues:   None reported     Chemical Use Review:   Substance Use: Chemical use reviewed, no active concerns identified      Tobacco Use: No current tobacco use.      Diagnosis:  1. Generalized anxiety disorder        Collateral Reports Completed:   Routed note to PCP    PLAN: (Patient Tasks / Therapist Tasks / Other)  Patient will attend the next session and review safety plan.        JOSE Paredes

## 2021-04-27 NOTE — LETTER
Hi! Patient attended intake appointment for mental health diagnostic assessment and scheduled a follow up appointment for continued services.    Joyce Macias MA [facility]

## 2021-05-17 ENCOUNTER — VIRTUAL VISIT (OUTPATIENT)
Dept: PSYCHOLOGY | Facility: CLINIC | Age: 36
End: 2021-05-17
Payer: COMMERCIAL

## 2021-05-17 DIAGNOSIS — F41.9 ANXIETY: ICD-10-CM

## 2021-05-17 DIAGNOSIS — F33.1 MAJOR DEPRESSIVE DISORDER, RECURRENT EPISODE, MODERATE (H): ICD-10-CM

## 2021-05-17 DIAGNOSIS — F33.0 MAJOR DEPRESSIVE DISORDER, RECURRENT EPISODE, MILD (H): Primary | ICD-10-CM

## 2021-05-17 PROCEDURE — 90791 PSYCH DIAGNOSTIC EVALUATION: CPT | Mod: 95 | Performed by: COUNSELOR

## 2021-05-17 NOTE — PROGRESS NOTES
"[facility]   Provider Name:  Joyce Macias MA [facility]           PATIENT'S NAME: Rossy Nagel  PREFERRED NAME: Sabrina  PRONOUNS:  she/her     MRN: [id number]  : 1985  ADDRESS: [address] [place] [id number]   [facility]:  [id number]  DATE OF SERVICE: 21  START TIME: 1106  END TIME: 1147  This assessment was completed over 2 therapy sessions.  PREFERRED PHONE: [phone]  May we leave a program related message: Yes  SERVICE MODALITY:  Video Visit:      Provider verified identity through the following two step process.  Patient provided:  Patient     Telemedicine Visit: The patient's condition can be safely assessed and treated via synchronous audio and visual telemedicine encounter.      Reason for Telemedicine Visit: Services only offered telehealth    Originating Site (Patient Location): Patient's home    Distant Site (Provider Location): Provider Remote Setting    Consent:  The patient/guardian has verbally consented to: the potential risks and benefits of telemedicine (video visit) versus in person care; bill my insurance or make self-payment for services provided; and responsibility for payment of non-covered services.     Patient would like the video invitation sent by:  Send to e-mail at: [email or web address]    Mode of Communication:  Video Conference via [facility]    As the provider I attest to compliance with applicable laws and regulations related to telemedicine.    UNIVERSAL ADULT Mental Health DIAGNOSTIC ASSESSMENT    -  Identifying Information:  Patient is a 35 year old, .  The pronoun use throughout this assessment reflects the patient's chosen pronoun.  Patient was referred for an assessment by primary care provider.  Patient attended the session alone.     Chief Complaint:   The reason for seeking services at this time is: \" stress and I feel like my medication isn't doing enough and I don't want to change it \"   The problem(s) began 6 months ago. " "Patient has attempted to resolve these concerns in the past through medication. She reported that she did individual therapy 7-8 years ago when \"it was really bad and I started getting medicated.\"    Social/Family History:  Patient reported they grew up in [place], [place].  They were raised by biological parents.  Parents  24 years ago when the client was 11 years old. The client's mother did not remarry and remains single The client's father did not remarry and remains single.   Patient reported that their childhood was \"rough.\"  Patient described their current relationships with family of origin as \"not necessary estranged but we don't make an effort to see each other. I haven't seen my brother in 8 years and he lives in [place], mother lives in [place], and father is in [place], [place]. I text my mom and my dad calls but I don't like to talk to him because I don't like small talk, but I always answer the phone when he calls.\"     The patient describes their cultural background as \"nothing really.\"  Cultural influences and impact on patient's life structure, values, norms, and healthcare: Racial or Ethnic Self-Identification white.  Contextual influences on patient's health include: Family Factors strained/distant relationships with family of origin.    These factors will be addressed in the Preliminary Treatment plan.  Patient identified their preferred language to be English. Patient reported they does not need the assistance of an  or other support involved in therapy.     Patient reported had no significant delays in developmental tasks.   Patient's highest education level was high school graduate and got a certificate in animal science.Patient identified the following learning problems: none reported.  Modifications will not be used to assist communication in therapy.  Patient reports they are  able to understand written materials. She reported that she was a twin and they were born 8 weeks " "early and had to attend the [facility] program after  and before she started .     Patient reported the following relationship history \"uneventful.\"  Patient's current relationship status is  for 15 years and together since 2002.   Patient identified their sexual orientation as and \"bi-curious\".  Patient reported having two child(kaleb). Patient identified mother and spouse as part of their support system.  Patient identified the quality of these relationships as stable and meaningful.      Patient's current living/housing situation involves staying in own home/apartment.  They live with  and 2 children and they report that housing is stable.     Patient is currently employed part time and reports they are able to function appropriately at work. She reported being a  since early December in 2020. Patient reports their finances are obtained through employment and spouse.  Patient does not identify finances as a current stressor.     Patient reported that they have not been involved with the legal system.   Patient denies being on probation / parole / under the jurisdiction of the court.    Patient's Strengths and Limitations:  Patient identified the following strengths or resources that will help them succeed in treatment: commitment to health and well being, family support, insight and sense of humor. Things that may interfere with the patient's success in treatment include: few friends.     Personal and Family Medical History:   Patient does report a family history of mental health concerns. Patient believes her mother had depression and was never diagnosed or treated.  Patient reports family history includes Asthma in her brother; Cancer in her paternal grandmother; Depression in her mother; Diabetes in her mother; Heart Disease in her maternal grandfather; Hypertension in her maternal grandfather, maternal grandmother, and mother; Obesity in her mother..     Patient does report " Mental Health Diagnosis and/or Treatment.  Patient Patient reported the following previous diagnoses which include(s): an Anxiety Disorder and Depression.  Patient reported anxiety symptoms began at age 4 and depression at the age of 16.   Patient has received mental health services in the past: medication and individual therapy.  Psychiatric Hospitalizations: None.  Patient denies a history of civil commitment.  Currently, patient is receiving other mental health services.  These include medication management with PCP.           Patient has had a physical exam to rule out medical causes for current symptoms.  Date of last physical exam was greater than a year ago and client was encouraged to schedule an exam with PCP. The patient has a [place] Primary Care Provider, who is named Carmita Ramos..  Patient reports no current medical and/or dental concerns.  Patient denies any issues with pain..   There are not significant appetite / nutritional concerns / weight changes. She reported that she is overweight and working on losing weight.  Patient does not report a history of head injury / trauma / cognitive impairment.      Patient reports current meds as:   Outpatient Medications Marked as Taking for the 5/17/21 encounter (Virtual Visit[facility]   Medication Sig     loratadine (CLARITIN) 10 MG tablet Take 1 tablet (10 mg) by mouth daily     paragard intrauterine copper 1 each by Intrauterine route once     sertraline (ZOLOFT) 100 MG tablet TAKE 1 TABLET (100MG) BY MOUTH ONCE DAILY.  NEED  APPT  FOR  FURTHER  REFILLS       Medication Adherence:  Patient reports taking prescribed medications as prescribed.    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          Current Mental Status Exam:   Appearance:  Appropriate    Eye Contact:  Good   Psychomotor:  Normal       Gait / station:  Unable to assess due to video call  Attitude / Demeanor: Cooperative  Pleasant  Speech      Rate / Production: Normal/ Responsive      Volume:  Normal  volume      Language:  intact  Mood:   Depressed   Affect:   Appropriate    Thought Content: Clear   Thought Process: Coherent  Logical       Associations: No loosening of associations  Insight:   Fair   Judgment:  Intact   Orientation:  All  Attention/concentration: Good    Rating Scales:    PHQ9:    PHQ-9 SCORE 2/16/2021 4/22/2021 5/12/2021   PHQ-9 Total Score - - -   PHQ-9 Total Score MyChart 6 (Mild depression) 6 (Mild depression) 10 (Moderate depression)   PHQ-9 Total Score 6 6 10   ;    GAD7:    PENNY-7 SCORE 2/16/2021 4/22/2021 5/12/2021   Total Score - - -   Total Score 7 (mild anxiety) 4 (minimal anxiety) 6 (mild anxiety)   Total Score 7 4 6     CGI:     First:Considering your total clinical experience with this particular patient population, how severe are the patient's symptoms at this time?: 4 (5/17/2021 11:40 AM)  ;    Most recentCompared to the patient's condition at the START of treatment, this patient's condition is: 3 (5/17/2021 11:40 AM)      Substance Use: Patient  Denied any concerns related to substance and scored a 0 on the cage.        Significant Losses / Trauma / Abuse / Neglect Issues:   Patient did not serve in the .  There are indications or report of significant loss, trauma, abuse or neglect issues related to: are no indications and client denies any losses, trauma, abuse, or neglect concerns.  Concerns for possible neglect are not present.     Safety Assessment:   Current Safety Concerns:  Equality Suicide Severity Rating Scale (Lifetime/Recent)  Equality Suicide Severity Rating (Lifetime/Recent) 4/27/2021   1. Wish to be Dead (Lifetime) Yes   Wish to be Dead Description (Lifetime) last experienced  thoughts about 3 years ago   1. Wish to be Dead (Recent) No   2. Non-Specific Active Suicidal Thoughts (Lifetime) Yes   2. Non-Specific Active Suicidal Thoughts (Recent) No   3. Active Suicidal Ideation with any Methods (Not Plan) Without Intent to Act (Lifetime) Yes   Active Suicidal Ideation with any Methods (Not Plan) Description (Lifetime) last experienced 7 years ago and built up over time-no specific method   3. Active Suicidal Ideation with any Methods (Not Plan) Without Intent to Act (Recent) No   4. Active Suicidal Ideation with Some Intent to Act, Without Specific Plan (Lifetime) No   4. Active Suicidal Ideation with Some Intent to Act, Without Specific Plan (Recent) No   5. Active Suicidal Ideation with Specific Plan and Intent (Lifetime) No   5. Active Suicidal Ideation with Specific Plan and Intent (Recent) No   Most Severe Ideation Rating (Lifetime) 3   Most Severe Ideation Description (Lifetime) 7-8 years ago   Frequency (Lifetime) 1   Duration (Lifetime) 3   Controllability (Lifetime) 3   Protective Factors  (Lifetime) 2   Reasons for Ideation (Lifetime) 5   Most Severe Ideation Rating (Past Month) NA   Frequency (Past Month) NA   Duration (Past Month) NA   Controllability (Past Month) NA   Protective Factors (Past Month) NA   Reasons for Ideation (Past Month) NA   Actual Attempt (Lifetime) No   Actual Attempt (Past 3 Months) No   Has subject engaged in non-suicidal self-injurious behavior? (Lifetime) (No Data)   Comments cutting 5 years ago   Has subject engaged in non-suicidal self-injurious behavior? (Past 3 Months) No   Interrupted Attempts (Lifetime) No   Interrupted Attempts (Past 3 Months) No   Aborted or Self-Interrupted Attempt (Lifetime) No   Aborted or Self-Interrupted Attempt (Past 3 Months) No   Preparatory Acts or Behavior (Past 3 Months) No   Most Recent Attempt Actual Lethality Code NA   Most Lethal Attempt Actual Lethality Code NA   Initial/First Attempt Actual Lethality Code NA  "    Patient denies current homicidal ideation and behaviors.  Patient denies current self-injurious ideation and behaviors.    Patient denied risk behaviors associated with substance use.  Patient denies any high risk behaviors associated with mental health symptoms.  Patient reports the following current concerns for their personal safety: None.  Patient reports there are not  firearms in the house. NA.     History of Safety Concerns:  Patient denied a history of homicidal ideation.     Patient denied a history of personal safety concerns.    Patient denied a history of assaultive behaviors.    Patient denied a history of sexual assault behaviors.     Patient denied a history of risk behaviors associated with substance use.  Patient denies any history of high risk behaviors associated with mental health symptoms.  Patient reports the following protective factors: dedication to family/friends, safe and stable environment, adherence with prescribed medication, living with other people, daily obligations, structured day and committment to well-being    Risk Plan:  See Recommendations for Safety and Risk Management Plan    Review of Symptoms per patient report:  Depression: Change in energy level, Feelings of helplessness, Low self-worth, Ruminations, Irritability, Feeling sad, down, or depressed, Withdrawn and Poor hygeine  Ellen:  No Symptoms  Psychosis: No Symptoms  Anxiety: Excessive worry, Social anxiety, Sleep disturbance, Ruminations and Irritability  Panic:  Palpitations, Tremors, Shortness of breath, Tingling and Numbness She reported last having panic attack a few months ago, and noted that they \"rarely\" happen.  Post Traumatic Stress Disorder:  No Symptoms   Eating Disorder: No Symptoms  ADD / ADHD:  No symptoms  Conduct Disorder: No symptoms  Autism Spectrum Disorder: No symptoms  Obsessive Compulsive Disorder: No Symptoms    Patient reports the following compulsive behaviors and treatment history: patient " denied.      Diagnostic Criteria:   A. Excessive anxiety and worry about a number of events or activities (such as work or school performance).   B. The person finds it difficult to control the worry.   - Being easily fatigued.    - Irritability.    - Muscle tension.    - Sleep disturbance (difficulty falling or staying asleep, or restless unsatisfying sleep).   D. The focus of the anxiety and worry is not confined to features of an Axis I disorder.  E. The anxiety, worry, or physical symptoms cause clinically significant distress or impairment in social, occupational, or other important areas of functioning.   A) Recurrent episode(s) - symptoms have been present during the same 2-week period and represent a change from previous functioning 5 or more symptoms (required for diagnosis)   - Depressed mood. Note: In children and adolescents, can be irritable mood.     - Decreased sleep.    - Fatigue or loss of energy.    - Feelings of worthlessness or inappropriate guilt.    - Diminished ability to think or concentrate, or indecisiveness.   B) The symptoms cause clinically significant distress or impairment in social, occupational, or other important areas of functioning  C) The episode is not attributable to the physiological effects of a substance or to another medical condition  D) The occurence of major depressive episode is not better explained by other thought / psychotic disorders  E) There has never been a manic episode or hypomanic episode    Functional Status:  Patient reports the following functional impairments: childcare / parenting, health maintenance, home life with family, management of the household and or completion of tasks, self-care and social interactions.     WHODAS:   WHODAS 2.0 Total Score 4/22/2021 5/12/2021   Total Score 31 25   Total Score MyChart 31 25     Nonprogrammatic care:  Patient is requesting basic services to address current mental health concerns.    Clinical Summary:  1. Reason  for assessment: referred by PCP and stress  .  2. Psychosocial, Cultural and Contextual Factors: stress  .  3. Principal DSM5 Diagnoses  (Sustained by DSM5 Criteria Listed Above):   296.31 (F33.0) Major Depressive Disorder, Recurrent Episode, Mild _  300.02 (F41.1) Generalized Anxiety Disorder.  4. Other Diagnoses that is relevant to services:  None at this time.  5. Provisional Diagnosis:  None at this time.  6. Prognosis: Expect Improvement.  7. Likely consequences of symptoms if not treated: possibly needing a higher level of care.  8. Client strengths include:  caring, empathetic, employed, has a previous history of therapy, insightful, intelligent, motivated, open to learning and responsible parent .     Recommendations:     1. Plan for Safety and Risk Management:   A safety and risk management plan has been developed including: Patient consented to co-developed safety plan.  Safety and risk management plan was completed.  Patient agreed to use safety plan should any safety concerns arise.  A copy was given to the patient..          Report to child / adult protection services was NA.     2. Patient denied any cultural concerns that she would like addressed in her treatment.    3. Initial Treatment will focus on:    Depressed Mood - reduced symptoms and learn skills  Anxiety - reduce anxiety symptoms and learn to cope effectively through skill use.     4. Resources/Service Plan:    services are not indicated.   Modifications to assist communication are not indicated.   Additional disability accommodations are not indicated.      5. Collaboration:   Collaboration / coordination of treatment will be initiated with the following  support professionals: primary care physician.      6.  Referrals:   The following referral(s) will be initiated: Outpatient Mental [facility] Therapy. Next Scheduled Appointment: 6/2/2021.     A Release of Information has been obtained for the following: Emergency Contact.  She  provided verbal consent for Van to be her emergency contact on 5/17/2021 at 1137 and his # [phone].    7. MARIAN:    MARIAN:  Discussed the general effects of drugs and alcohol on health and well-being. Provider gave patient printed information about the effects of chemical use on their health and well being. Recommendations:  NA .     8. Records:   These were reviewed at time of assessment.   Information in this assessment was obtained from the medical record and  provided by patient who is a fair historian.    Patient will have open access to their mental health medical record.      Provider Name/ Credentials:  Joyce Macias MA [facility]    April 27, 2021                    Name: Rossy Nagel  YOB: 1985  Date: April 27, 2021   My primary care provider: Carmita Ramos  My primary care clinic: [facility]  My prescriber: Dr. Carmita Ramos  Other care team support:  therapist   My Triggers:  relationship conflict, work, home environment, limited amount of peer relationships     Additional People, Places, and Things that I can access for support: , my friends       What is important to me and makes life worth living:   and children .         GREEN    Good Control  1. I feel good  2. No suicidal thoughts   3. Can work, sleep and play      Action Steps  1. Self-care: balanced meals, exercising, sleep practices, etc.  2. Take your medications as prescribed.  3. Continue meetings with therapist and prescriber.  4.  Do the healthy things that I enjoy.  5. Not over think           YELLOW  Getting Worse  I have ANY of these:  1. I do not feel good  2. Difficulty Concentrating  3. Sleep is changing  4. Increase/Change in my thoughts to hurt self and/or others, but I can still manage and not act on it.   5. Not taking care of self.  6. Loss of interest in hobbies             Action Steps (in addition to the above):  1. Inform your therapist and psychiatric prescriber/PCP.  2. Keep taking your  medications as prescribed.    3. Turn to people you can ask for help.  4. Use internal coping strategies -see below.  5. Create safe environment: notify friends/family of increase in symptoms  6. Read            RED  Get Help  If I have ANY of these:  1. Current and uncontrollable thoughts and/or behaviors to hurt self and/or others.   2. Thoughts of self-harm   Actions to manage my safety  1. Contact your emergency person   2. Call my crisis team- [facility] [phone]  3. Or Call 911 or go to the emergency room right away  4.  Hug my dog!        My Internal Coping Strategies include the following:  take a bath, blow bubbles, belly breathing, arts and crafts, color, chew gum, fidget toys, play with my pet, use my coping box, exercise and use my coping skills    [End for Brief Safety Plan]     Safety Concerns  How To Identify Situations That Make Your Mental Health Worse:  Triggers are things that make your mental health worse.  Look at the list below to help you find your triggers and what you can do about them.     1. Identify Early Warning Signs:    Sometimes symptoms return, even when people do their best to stay well. Symptoms can develop over a short period of time with little or no warning, but most of the time they emerge gradually over several weeks.  Early warning signs are changes that people experience when a relapse is starting. Some early warning signs are common and others are not as common.   Common Early Warning Signs:    Feeling tense or nervous, Trouble sleeping -either too much or too little sleep, Feeling depressed or low, Feeling irritable, Feeling like not being around other people, Trouble concentrating and Urges to harm self     2. Identify action steps to take when warning signs are noticed:    Taking Action- It is important to take action if you are experiencing early warning signs of a relapse.  The faster you act, the more likely it is that you can avoid a full relapse.   It is helpful to identify several specific ways to cope with symptoms.      The following is my list of symptoms and coping strategies that I can use when they are present:    Symptom Coping Strategies   Anxiety -Talk with someone in your support system and let him or her know how you are feeling.  -Use relaxation techniques such as deep breathing or imagery.  -Use positive affirmations to counteract negative self-talk such as  I am learning to let go of worry.    Depression - Schedule your day; include activities you have to do and activities you enjoy doing.  - Get some exercise - walk, run, bike, or swim.  - Give yourself credit for even the smallest things you get done.   Sleep Difficulties   - Go to sleep at the same time every day.  - Do something relaxing before bed, such as drinking herbal tea or listening to music.  - Avoid having discussions about upsetting topics before going to bed.   Delusions   - Distract yourself from the disturbing thought by doing something that requires your attention such as a puzzle.  - Check out your beliefs by talking to someone you trust.    Hallucinations   - Use headphones to listen to music.  - Tell voices to  stop  or say to yourself,  I am safe.   - Ignore the hallucinations as much as possible; focus on other things.   Concentration Difficulties - Minimize distractions so there is only one thing for you to focus on at a time.    - Ask the person you are having a conversation with to slow down or repeat things you are unsure of.

## 2021-05-18 NOTE — TELEPHONE ENCOUNTER
Pending Prescriptions:                       Disp   Refills    sertraline (ZOLOFT) 100 MG tablet [Pharmac*90 tab*0        Sig: TAKE 1 TABLET (100MG) BY MOUTH ONCE DAILY.  NEED            APPT  FOR  FURTHER  REFILLS      Routing refill request to provider for review/approval because:  Labs out of range:  phq9    Meka Rodgers RN on 5/18/2021 at 12:54 PM

## 2021-05-19 RX ORDER — SERTRALINE HYDROCHLORIDE 100 MG/1
TABLET, FILM COATED ORAL
Qty: 90 TABLET | Refills: 0 | Status: SHIPPED | OUTPATIENT
Start: 2021-05-19 | End: 2021-08-18

## 2021-06-02 ENCOUNTER — VIRTUAL VISIT (OUTPATIENT)
Dept: PSYCHOLOGY | Facility: CLINIC | Age: 36
End: 2021-06-02
Payer: COMMERCIAL

## 2021-06-02 DIAGNOSIS — F33.0 MAJOR DEPRESSIVE DISORDER, RECURRENT EPISODE, MILD (H): Primary | ICD-10-CM

## 2021-06-02 PROCEDURE — 90837 PSYTX W PT 60 MINUTES: CPT | Mod: 95 | Performed by: COUNSELOR

## 2021-06-02 NOTE — PROGRESS NOTES
Progress Note    Patient Name: Rossy Nagel  Date: 2021         Service Type: Individual      Session Start Time: 1000  Session End Time: 1055     Session Length: 55 minutes    Session #: 3    Attendees: Client attended alone    Service Modality:  Video Visit:      Provider verified identity through the following two step process.  Patient provided:  Patient     Telemedicine Visit: The patient's condition can be safely assessed and treated via synchronous audio and visual telemedicine encounter.      Reason for Telemedicine Visit: Services only offered telehealth    Originating Site (Patient Location): Patient's home    Distant Site (Provider Location): Provider Remote Setting    Consent:  The patient/guardian has verbally consented to: the potential risks and benefits of telemedicine (video visit) versus in person care; bill my insurance or make self-payment for services provided; and responsibility for payment of non-covered services.     Patient would like the video invitation sent by:  Send to e-mail at: [email or web address]    Mode of Communication:  Video Conference via [employer]    As the provider I attest to compliance with applicable laws and regulations related to telemedicine.     Treatment Plan Last Reviewed: 2021  PHQ-9 / PENNY-7 : 2021  PHQ 2021   PHQ-9 Total Score 6 6 10   Q9: Thoughts of better off dead/self-harm past 2 weeks Not at all Not at all Not at all   F/U: Thoughts of suicide or self-harm - - -   F/U: Safety concerns - - -     PENNY-7 SCORE 2021   Total Score - - -   Total Score 7 (mild anxiety) 4 (minimal anxiety) 6 (mild anxiety)   Total Score 7 4 6         DATA  Interactive Complexity: No  Crisis: No       Progress Since Last Session (Related to Symptoms / Goals / Homework):   Symptoms: No change since last session She endorsed symptoms of depression and anxiety.    Homework: Completed  in session      Episode of Care Goals: Minimal progress - PREPARATION (Decided to change - considering how); Intervened by negotiating a change plan and determining options / strategies for behavior change, identifying triggers, exploring social supports, and working towards setting a date to begin behavior change     Current / Ongoing Stressors and Concerns:   Current-work, relationships, mental health symptoms    Writer and patient spent time completing a treatment plan to reflect meaningful goals and intentions. Patient reported that she lacks motivation about cleaning her home. Patient processed her experience in childhood with cleaning and having a mother who kept trash and clutter in their home and cleaning was a punishment from her father. Patient shared the challenges she experiences in her own home with attending to responsibilities. Writer validated her experiences, assisted her with making connections to past experiences with current behavior, and worked on setting small goals on attending to tasks around the house. Patient was receptive to support and feedback. She denied any SI and SIB.    Treatment plan was completed.    Ongoing-work, relationships, mental health symptoms       Treatment Objective(s) Addressed in This Session:   Client will learn and implement distress tolerance skills to reduce anxiety twice a week.     Intervention:   Motivational Interviewing    MI Intervention: Expressed Empathy/Understanding, Supported Autonomy, Collaboration, Evocation, Permission to raise concern or advise, Open-ended questions, Reflections: simple and complex, Change talk (evoked) and Reframe     Change Talk Expressed by the Patient: Desire to change    Provider Response to Change Talk: E - Evoked more info from patient about behavior change, A - Affirmed patient's thoughts, decisions, or attempts at behavior change, R - Reflected patient's change talk and S - Summarized patient's change talk  statements          ASSESSMENT: Current Emotional / Mental Status (status of significant symptoms):   Risk status (Self / Other harm or suicidal ideation)   Patient denies current fears or concerns for personal safety.   Patient denies current or recent suicidal ideation or behaviors.   Patient denies current or recent homicidal ideation or behaviors.   Patient denies current or recent self injurious behavior or ideation.   Patient denies other safety concerns.   Patient reports there has been no change in risk factors since their last session.     Patient reports there has been no change in protective factors since their last session.     A safety and risk management plan has been developed including: Patient consented to co-developed safety plan.  Safety and risk management plan was completed.  Patient agreed to use safety plan should any safety concerns arise.  A copy was given to the patient.     Appearance:   Appropriate    Eye Contact:   Fair    Psychomotor Behavior: Normal    Attitude:   Cooperative  Friendly   Orientation:   All   Speech    Rate / Production: Normal     Volume:  Normal    Mood:    Normal   Affect:    Appropriate    Thought Content:  Clear    Thought Form:  Coherent  Circumstantial   Insight:    Fair      Medication Review:   No changes to current psychiatric medication(s)     Medication Compliance:   Yes     Changes in Health Issues:   None reported     Chemical Use Review:   Substance Use: Chemical use reviewed, no active concerns identified      Tobacco Use: No current tobacco use.      Diagnosis:  1. Major depressive disorder, recurrent episode, mild (H)        Collateral Reports Completed:   Not Applicable    PLAN: (Patient Tasks / Therapist Tasks / Other)  Patient will attend the next session and she will complete 3 tasks for 10 minutes each.        [facility]                                                         Treatment Plan    Client's Name: Rossy Adamsuis  Date Of  Birth: 1985    Date: 6/2/2021    DSM-V Diagnoses: 296.31 (F33.0) Major Depressive Disorder, Recurrent Episode, Mild _ or 300.02 (F41.1) Generalized Anxiety Disorder  Psychosocial / Contextual Factors: part-time employed, overall stress  WHODAS:   WHODAS 2.0 Total Score 4/22/2021 5/12/2021   Total Score 31 25   Total Score MyChart 31 25       Referral / Collaboration:  Referral to another professional/service is not indicated at this time..    Anticipated number of session or this episode of care: 35      MeasurableTreatment Goal(s) related to diagnosis / functional impairment(s)  Goal 1: Client will reduce symptoms of depression and anxiety as evidenced by patient's self-report and a reduction of 4 points on both the PENNY-7 and PHQ-9.    I will know I've met my goal when I will feel better.      Objective #A (Client Action)    Client will retain safety, reduce SI and SIB, and commit to safety during sessions.  Status: New - Date: 6/2/2021     Intervention(s)  Therapist will assist with safety planning, building support system, identifying urges, encourage and problem solve barriers to skill use, check-in on safety concerns, use behavior chains, and help build insight and awareness.         Objective #B  Client will drink 2 glass of water per day and walk 15 minutes per day.  Status: New - Date: 6/2/2021     Intervention(s)  Therapist will provide psycho-education on the PLEASED skill in the emotion regulation DBT module, will assist with problem solving barriers, provide encouragement and praise.    Objective #C  Client will learn and implement distress tolerance skills to reduce anxiety twice a week.  Status: New - Date: 6/2/2021     Intervention(s)  Therapist will provide psycho-education on distress tolerance skills and mindfulness, assist with problem solving barriers to effective skill use, and offer skills coaching and skill identification.          Patient has reviewed and agreed to the above  plan.      Joyce Macias, [facility]  June 2, 2021    Name: Rossy Nagel  YOB: 1985  Date: April 27, 2021   My primary care provider: Carmita Ramos  My primary care clinic: [facility]  My prescriber: Dr. Carmita Ramos  Other care team support:  therapist   My Triggers:  relationship conflict, work, home environment, limited amount of peer relationships     Additional People, Places, and Things that I can access for support: , my friends       What is important to me and makes life worth living:   and children .         GREEN    Good Control  1. I feel good  2. No suicidal thoughts   3. Can work, sleep and play      Action Steps  1. Self-care: balanced meals, exercising, sleep practices, etc.  2. Take your medications as prescribed.  3. Continue meetings with therapist and prescriber.  4.  Do the healthy things that I enjoy.  5. Not over think           YELLOW  Getting Worse  I have ANY of these:  1. I do not feel good  2. Difficulty Concentrating  3. Sleep is changing  4. Increase/Change in my thoughts to hurt self and/or others, but I can still manage and not act on it.   5. Not taking care of self.  6. Loss of interest in hobbies             Action Steps (in addition to the above):  1. Inform your therapist and psychiatric prescriber/PCP.  2. Keep taking your medications as prescribed.    3. Turn to people you can ask for help.  4. Use internal coping strategies -see below.  5. Create safe environment: notify friends/family of increase in symptoms  6. Read            RED  Get Help  If I have ANY of these:  1. Current and uncontrollable thoughts and/or behaviors to hurt self and/or others.   2. Thoughts of self-harm   Actions to manage my safety  1. Contact your emergency person   2. Call my crisis team- [facility] [phone]  3. Or Call 911 or go to the emergency room right away  4.  Hug my dog!        My Internal Coping Strategies include the following:  take a bath, blow  bubbles, belly breathing, arts and crafts, color, chew gum, fidget toys, play with my pet, use my coping box, exercise and use my coping skills    [End for Brief Safety Plan]     Safety Concerns  How To Identify Situations That Make Your Mental Health Worse:  Triggers are things that make your mental health worse.  Look at the list below to help you find your triggers and what you can do about them.     1. Identify Early Warning Signs:    Sometimes symptoms return, even when people do their best to stay well. Symptoms can develop over a short period of time with little or no warning, but most of the time they emerge gradually over several weeks.  Early warning signs are changes that people experience when a relapse is starting. Some early warning signs are common and others are not as common.   Common Early Warning Signs:    Feeling tense or nervous, Trouble sleeping -either too much or too little sleep, Feeling depressed or low, Feeling irritable, Feeling like not being around other people, Trouble concentrating and Urges to harm self     2. Identify action steps to take when warning signs are noticed:    Taking Action- It is important to take action if you are experiencing early warning signs of a relapse.  The faster you act, the more likely it is that you can avoid a full relapse.  It is helpful to identify several specific ways to cope with symptoms.      The following is my list of symptoms and coping strategies that I can use when they are present:    Symptom Coping Strategies   Anxiety -Talk with someone in your support system and let him or her know how you are feeling.  -Use relaxation techniques such as deep breathing or imagery.  -Use positive affirmations to counteract negative self-talk such as  I am learning to let go of worry.    Depression - Schedule your day; include activities you have to do and activities you enjoy doing.  - Get some exercise - walk, run, bike, or swim.  - Give yourself credit  for even the smallest things you get done.   Sleep Difficulties   - Go to sleep at the same time every day.  - Do something relaxing before bed, such as drinking herbal tea or listening to music.  - Avoid having discussions about upsetting topics before going to bed.   Delusions   - Distract yourself from the disturbing thought by doing something that requires your attention such as a puzzle.  - Check out your beliefs by talking to someone you trust.    Hallucinations   - Use headphones to listen to music.  - Tell voices to  stop  or say to yourself,  I am safe.   - Ignore the hallucinations as much as possible; focus on other things.   Concentration Difficulties - Minimize distractions so there is only one thing for you to focus on at a time.    - Ask the person you are having a conversation with to slow down or repeat things you are unsure of.

## 2021-08-16 DIAGNOSIS — F33.1 MAJOR DEPRESSIVE DISORDER, RECURRENT EPISODE, MODERATE (H): ICD-10-CM

## 2021-08-16 DIAGNOSIS — F41.9 ANXIETY: ICD-10-CM

## 2021-08-18 RX ORDER — SERTRALINE HYDROCHLORIDE 100 MG/1
TABLET, FILM COATED ORAL
Qty: 90 TABLET | Refills: 0 | Status: SHIPPED | OUTPATIENT
Start: 2021-08-18 | End: 2021-08-26

## 2021-08-18 NOTE — TELEPHONE ENCOUNTER
Pending Prescriptions:                       Disp   Refills    sertraline (ZOLOFT) 100 MG tablet [Pharmac*90 tab*0        Sig: TAKE 1 TABLET BY MOUTH ONCE DAILY . APPOINTMENT           REQUIRED FOR FUTURE REFILLS    Routing refill request to provider for review/approval because:  Patient needs to be seen because it has been more than 1 year since last office visit.

## 2021-08-19 NOTE — TELEPHONE ENCOUNTER
Due for appt. Lien given, please schedule. (Phone, ov, or evisit as appropriate).  Carmita Ramos MD

## 2021-08-26 ENCOUNTER — VIRTUAL VISIT (OUTPATIENT)
Dept: FAMILY MEDICINE | Facility: OTHER | Age: 36
End: 2021-08-26
Payer: COMMERCIAL

## 2021-08-26 DIAGNOSIS — F33.1 MAJOR DEPRESSIVE DISORDER, RECURRENT EPISODE, MODERATE (H): ICD-10-CM

## 2021-08-26 DIAGNOSIS — F41.9 ANXIETY: ICD-10-CM

## 2021-08-26 PROCEDURE — 99213 OFFICE O/P EST LOW 20 MIN: CPT | Mod: 95 | Performed by: FAMILY MEDICINE

## 2021-08-26 RX ORDER — SERTRALINE HYDROCHLORIDE 100 MG/1
TABLET, FILM COATED ORAL
Qty: 90 TABLET | Refills: 1 | Status: SHIPPED | OUTPATIENT
Start: 2021-08-26 | End: 2022-06-01

## 2021-08-26 ASSESSMENT — PATIENT HEALTH QUESTIONNAIRE - PHQ9
SUM OF ALL RESPONSES TO PHQ QUESTIONS 1-9: 10
5. POOR APPETITE OR OVEREATING: NOT AT ALL

## 2021-08-26 ASSESSMENT — ANXIETY QUESTIONNAIRES
GAD7 TOTAL SCORE: 6
3. WORRYING TOO MUCH ABOUT DIFFERENT THINGS: SEVERAL DAYS
2. NOT BEING ABLE TO STOP OR CONTROL WORRYING: SEVERAL DAYS
6. BECOMING EASILY ANNOYED OR IRRITABLE: MORE THAN HALF THE DAYS
5. BEING SO RESTLESS THAT IT IS HARD TO SIT STILL: NOT AT ALL
1. FEELING NERVOUS, ANXIOUS, OR ON EDGE: SEVERAL DAYS
IF YOU CHECKED OFF ANY PROBLEMS ON THIS QUESTIONNAIRE, HOW DIFFICULT HAVE THESE PROBLEMS MADE IT FOR YOU TO DO YOUR WORK, TAKE CARE OF THINGS AT HOME, OR GET ALONG WITH OTHER PEOPLE: SOMEWHAT DIFFICULT
7. FEELING AFRAID AS IF SOMETHING AWFUL MIGHT HAPPEN: SEVERAL DAYS

## 2021-08-26 ASSESSMENT — PAIN SCALES - GENERAL: PAINLEVEL: NO PAIN (0)

## 2021-08-26 NOTE — PROGRESS NOTES
Sabrina is a 36 year old who is being evaluated via a billable video visit.      How would you like to obtain your AVS? MyChart  If the video visit is dropped, the invitation should be resent by: Text to cell phone: [phone]  Will anyone else be joining your video visit? No      Video Start Time: 1:12 PM    Assessment & Plan       ICD-10-CM    1. Anxiety  F41.9 sertraline (ZOLOFT) 100 MG tablet     MENTAL HEALTH REFERRAL  - Adult; Outpatient Treatment; Individual/Couples/Family/Group Therapy/Health Psychology; [facility] [phone]; We will contact you to schedule the appointment or please call with any questions   2. Major depressive disorder, recurrent episode, moderate (H)  F33.1 sertraline (ZOLOFT) 100 MG tablet     MENTAL HEALTH REFERRAL  - Adult; Outpatient Treatment; Individual/Couples/Family/Group Therapy/Health Psychology; [facility] [phone]; We will contact you to schedule the appointment or please call with any questions        Patient has lost mention with her counselor as she had to reschedule an appointment in clinic to sign some schedule and then never heard from anyone for the reschedule so she states she stopped going.  She had about 2 weeks of poor mood control and is afraid that this will return though is feeling better.  So at this time agreed to renew the medications the same dose but reorder up with her counselor to try and work through these when they occur I can consider increasing her medications to Zoloft 150 mg so that she not responding as anticipated with that plan.    27 minutes spent on the date of the encounter doing chart review, history and exam, documentation and further activities per the note       Depression Screening Follow Up    PHQ 8/26/2021   PHQ-9 Total Score 10   Q9: Thoughts of better off dead/self-harm past 2 weeks Several days   F/U: Thoughts of suicide or self-harm -   F/U: Safety concerns -                 Follow Up    Follow  Up Actions Taken  Crisis resource information provided in the After Visit Summary    Discussed the following ways the patient can remain in a safe environment:  be around others      Return in about 6 months (around 2/26/2022).    Carmita Ramos MD, MD  [facility] ANNIE Bennett is a 36 year old who presents for the following health issues     HPI     Depression and Anxiety Follow-Up    How are you doing with your depression since your last visit? Worsened     How are you doing with your anxiety since your last visit?  No change    Are you having other symptoms that might be associated with depression or anxiety? No    Have you had a significant life event? OTHER: mom got diagnosed with breast cancer     Do you have any concerns with your use of alcohol or other drugs? No    A few weeks ago had a few weeks down.    Social History     Tobacco Use     Smoking status: Never Smoker     Smokeless tobacco: Never Used   Substance Use Topics     Alcohol use: Yes     Comment: Rarely     Drug use: No     PHQ 4/22/2021 5/12/2021 8/26/2021   PHQ-9 Total Score 6 10 10   Q9: Thoughts of better off dead/self-harm past 2 weeks Not at all Not at all Several days   F/U: Thoughts of suicide or self-harm - - -   F/U: Safety concerns - - -     PENNY-7 SCORE 4/22/2021 5/12/2021 8/26/2021   Total Score - - -   Total Score 4 (minimal anxiety) 6 (mild anxiety) -   Total Score 4 6 6         Suicide Assessment Five-step Evaluation and Treatment (SAFE-T)          Review of Systems   Constitutional, HEENT, cardiovascular, pulmonary, GI, , musculoskeletal, neuro, skin, endocrine and psych systems are negative, except as otherwise noted.      Objective    Vitals - Patient Reported  Pain Score: No Pain (0)        Physical Exam   GENERAL: Healthy, alert and no distress  EYES: Eyes grossly normal to inspection.  No discharge or erythema, or obvious scleral/conjunctival abnormalities.  RESP: No audible wheeze, cough, or  visible cyanosis.  No visible retractions or increased work of breathing.    SKIN: Visible skin clear. No significant rash, abnormal pigmentation or lesions.  NEURO: Cranial nerves grossly intact.  Mentation and speech appropriate for age.  PSYCH: Mentation appears normal, affect normal/bright, judgement and insight intact, normal speech and appearance well-groomed.                Video-Visit Details    Type of service:  Video Visit    Video End Time:1:27 PM    Originating Location (pt. Location): Home    Distant Location (provider location):  [facility]     Platform used for Video Visit: zuuka!

## 2021-08-27 ASSESSMENT — ANXIETY QUESTIONNAIRES: GAD7 TOTAL SCORE: 6

## 2021-09-02 ENCOUNTER — OFFICE VISIT (OUTPATIENT)
Dept: URGENT CARE | Facility: URGENT CARE | Age: 36
End: 2021-09-02
Payer: COMMERCIAL

## 2021-09-02 VITALS
OXYGEN SATURATION: 99 % | SYSTOLIC BLOOD PRESSURE: 136 MMHG | HEART RATE: 97 BPM | TEMPERATURE: 99 F | DIASTOLIC BLOOD PRESSURE: 81 MMHG | WEIGHT: 161.6 LBS | BODY MASS INDEX: 30.12 KG/M2

## 2021-09-02 DIAGNOSIS — N30.01 ACUTE CYSTITIS WITH HEMATURIA: Primary | ICD-10-CM

## 2021-09-02 DIAGNOSIS — R30.0 DYSURIA: ICD-10-CM

## 2021-09-02 DIAGNOSIS — B37.31 YEAST INFECTION OF THE VAGINA: ICD-10-CM

## 2021-09-02 DIAGNOSIS — N89.8 VAGINAL ITCHING: ICD-10-CM

## 2021-09-02 LAB
ALBUMIN UR-MCNC: NEGATIVE MG/DL
APPEARANCE UR: CLEAR
BACTERIA #/AREA URNS HPF: ABNORMAL /HPF
BILIRUB UR QL STRIP: NEGATIVE
CLUE CELLS: ABNORMAL
COLOR UR AUTO: YELLOW
GLUCOSE UR STRIP-MCNC: NEGATIVE MG/DL
HGB UR QL STRIP: ABNORMAL
KETONES UR STRIP-MCNC: NEGATIVE MG/DL
LEUKOCYTE ESTERASE UR QL STRIP: ABNORMAL
MUCOUS THREADS #/AREA URNS LPF: PRESENT /LPF
NITRATE UR QL: NEGATIVE
PH UR STRIP: 7 [PH] (ref 5–7)
RBC #/AREA URNS AUTO: ABNORMAL /HPF
SP GR UR STRIP: 1.02 (ref 1–1.03)
SQUAMOUS #/AREA URNS AUTO: ABNORMAL /LPF
TRICHOMONAS, WET PREP: ABNORMAL
UROBILINOGEN UR STRIP-ACNC: 0.2 E.U./DL
WBC #/AREA URNS AUTO: ABNORMAL /HPF
WBC'S/HIGH POWER FIELD, WET PREP: ABNORMAL
YEAST, WET PREP: PRESENT

## 2021-09-02 PROCEDURE — 99214 OFFICE O/P EST MOD 30 MIN: CPT | Performed by: NURSE PRACTITIONER

## 2021-09-02 PROCEDURE — 87210 SMEAR WET MOUNT SALINE/INK: CPT | Performed by: NURSE PRACTITIONER

## 2021-09-02 PROCEDURE — 81001 URINALYSIS AUTO W/SCOPE: CPT | Performed by: NURSE PRACTITIONER

## 2021-09-02 RX ORDER — SULFAMETHOXAZOLE/TRIMETHOPRIM 800-160 MG
1 TABLET ORAL 2 TIMES DAILY
Qty: 10 TABLET | Refills: 0 | Status: SHIPPED | OUTPATIENT
Start: 2021-09-02 | End: 2021-09-07

## 2021-09-02 RX ORDER — FLUCONAZOLE 150 MG/1
150 TABLET ORAL ONCE
Qty: 1 TABLET | Refills: 0 | Status: SHIPPED | OUTPATIENT
Start: 2021-09-02 | End: 2021-09-02

## 2021-09-02 ASSESSMENT — PAIN SCALES - GENERAL: PAINLEVEL: MILD PAIN (2)

## 2021-09-02 NOTE — PROGRESS NOTES
Assessment & Plan   Problem List Items Addressed This Visit     None      Visit Diagnoses     Dysuria    -  Primary    Relevant Orders    UA macro with reflex to Microscopic and Culture - Clinc Collect (Completed)    Urine Microscopic (Completed)    Vaginal itching        Relevant Orders    Wet prep - Clinic Collect (Completed)             20 minutes spent on the date of the encounter doing chart review, history and exam, documentation and further activities per the note       There are no Patient Instructions on file for this visit.    No follow-ups on file.    JENIFER Courtney CNP  [facility]    Rubio Bennett is a 36 year old who presents for the following health issues     HPI     Chief Complaint   Patient presents with     Kidney Problem     2 weeks ago had a lot of urianry urgency, did OTC worked, This morning woke up with kidney pain, light headed, nausea and then it went away. Pain was at 7 now its a 1.      Was on her way to ER at 4 a.m. this morning but on the way pain subsided so went back home and pain still gone.      Review of Systems   Constitutional, HEENT, cardiovascular, pulmonary, GI, , musculoskeletal, neuro, skin, endocrine and psych systems are negative, except as otherwise noted.      Objective    /81 (BP Location: Left arm, Patient Position: Sitting, Cuff Size: Adult Small)   Pulse 97   Temp 99  F (37.2  C) (Tympanic)   Wt 73.3 kg (161 lb 9.6 oz)   LMP 09/01/2021 (Exact Date)   SpO2 99%   BMI 30.12 kg/m    Body mass index is 30.12 kg/m .  Physical Exam   GENERAL: healthy, alert and no distress, nontoxic in appearance  EYES: Eyes grossly normal to inspection, PERRL and conjunctivae and sclerae normal  HENT: normocephalic  NECK: supple with full ROM  RESP: lungs clear to auscultation - no rales, rhonchi or wheezes  CV: regular rate and rhythm, normal S1 S2, no S3 or S4, no murmur, click or rub, no peripheral edema   ABDOMEN: soft,  nontender, no hepatosplenomegaly, no masses and bowel sounds normal  MS: no gross musculoskeletal defects noted, no edema  Neg CVA tenderness    Results for orders placed or performed in visit on 09/02/21 (from the past 24 hour(s))   UA macro with reflex to Microscopic and Culture - Clinc Collect    Specimen: Urine, Midstream   Result Value Ref Range    Color Urine Yellow Colorless, Straw, Light Yellow, Yellow    Appearance Urine Clear Clear    Glucose Urine Negative Negative mg/dL    Bilirubin Urine Negative Negative    Ketones Urine Negative Negative mg/dL    Specific Gravity Urine 1.020 1.003 - 1.035    Blood Urine Large (A) Negative    pH Urine 7.0 5.0 - 7.0    Protein Albumin Urine Negative Negative mg/dL    Urobilinogen Urine 0.2 0.2, 1.0 E.U./dL    Nitrite Urine Negative Negative    Leukocyte Esterase Urine Trace (A) Negative   Wet prep - Clinic Collect    Specimen: Vagina; Swab   Result Value Ref Range    Trichomonas Absent Absent    Yeast Present (A) Absent    Clue Cells Absent Absent    WBCs/high power field 1+ (A) None   Urine Microscopic   Result Value Ref Range    Bacteria Urine Few (A) None Seen /HPF    RBC Urine  (A) 0-2 /HPF /HPF    WBC Urine 5-10 (A) 0-5 /HPF /HPF    Squamous Epithelials Urine Few (A) None Seen /LPF    Mucus Urine Present (A) None Seen /LPF    Narrative    Urine Culture not indicated     Patient's menses just started yesterday which could contribute to amount of blood in urine.

## 2021-09-02 NOTE — PATIENT INSTRUCTIONS
Antibiotics as directed   Urine culture is pending, will contact you if there is a change in treatment therapy.   Drink plenty of fluids. Prevention and treatment of UTI's discussed.   Signs and symptoms of pyelonephritis mentioned.   RTC if any worsening symptoms or if not improving as expected.     Patient Education     Bladder Infection, Female (Adult)     Urine normally doesn't have any germs (bacteria) in it. But bacteria can get into the urinary tract from the skin around the rectum. Or they can travel in the blood from other parts of the body. Once they are in your urinary tract, they can cause infection in these areas:    The urethra (urethritis)    The bladder (cystitis)    The kidneys (pyelonephritis)  The most common place for an infection is in the bladder. This is called a bladder infection. This is one of the most common infections in women. Most bladder infections are easily treated. They are not serious unless the infection spreads to the kidney.  The terms bladder infection, UTI, and cystitis are often used to describe the same thing. But they are not always the same. Cystitis is an inflammation of the bladder. The most common cause of cystitis is an infection.  Symptoms  The infection causes inflammation in the urethra and bladder. This causes many of the symptoms. The most common symptoms of a bladder infection are:    Pain or burning when urinating    Having to urinate more often than normal    Urgent need to urinate    Only a small amount of urine comes out    Blood in urine    Belly (abdominal) discomfort. This is often in the lower belly above the pubic bone.    Cloudy urine    Strong- or bad-smelling urine    Unable to urinate (urinary retention)    Unable to hold urine in (urinary incontinence)    Fever    Loss of appetite    Confusion (in older adults)  Causes  Bladder infections are not contagious. You can't get one from someone else, from a toilet seat, or from sharing a bath.  The most  common cause of bladder infections is bacteria from the bowels. The bacteria get onto the skin around the opening of the urethra. From there, they can get into the urine. Then they travel up to the bladder, causing inflammation and infection. This often happens because of:    Wiping incorrectly after urinating. Always wipe from front to back.    Bowel incontinence    Pregnancy    Procedures such as having a catheter put in    Older age    Not emptying your bladder. This can give bacteria a chance to grow in your urine.    Fluid loss (dehydration)    Constipation    Having sex    Using a diaphragm for birth control   Treatment  Bladder infections are diagnosed by a urine test and urine culture. They are treated with antibiotics. They often clear up quickly without problems. Treatment helps prevent a more serious kidney infection.  Medicines  Medicines can help in the treatment of a bladder infection:    Take antibiotics until they are used up, even if you feel better. It's important to finish them to make sure the infection has cleared.    You can use acetaminophen or ibuprofen for pain, fever, or discomfort, unless another medicine was prescribed. If you have long-term (chronic) liver or kidney disease, talk with your healthcare provider before using these medicines. Also talk with your provider if you've ever had a stomach ulcer or GI (gastrointestinal) bleeding, or are taking blood-thinner medicines.    If you are given phenazopydridine to reduce burning with urination, it will make your urine a bright orange color. This can stain clothing.  Care and prevention  These self-care steps can help prevent future infections:    Drink plenty of fluids. This helps to prevent dehydration and flush out your bladder. Do this unless you must restrict fluids for other health reasons, or your healthcare provider told you not to.    Clean yourself correctly after going to the bathroom. Wipe from front to back after using the  toilet. This helps prevent the spread of bacteria.    Urinate more often. Don't try to hold urine in for a long time.    Wear loose-fitting clothes and cotton underwear. Don't wear tight-fitting pants.    Improve your diet and prevent constipation. Eat more fresh fruits and vegetables, and fiber. Eat less junk foods and fatty foods.    Don't have sex until your symptoms are gone.    Don't have caffeine, alcohol, and spicy foods. These can irritate your bladder.    Urinate right after you have sex to flush out your bladder.    If you use birth control pills and have frequent bladder infections, discuss it with your healthcare provider.  Follow-up care  Call your healthcare provider if all symptoms are not gone after 3 days of treatment. This is especially important if you have repeat infections.  If a culture was done, you will be told if your treatment needs to be changed. If directed, you can call to find out the results.  If X-rays were done, you will be told if the results will affect your treatment.  Call 911  Call 911 if any of the following occur:    Trouble breathing    Hard to wake up or confusion    Fainting (loss of consciousness)    Fast heart rate  When to get medical advice  Call your healthcare provider right away if any of these occur:    Fever of 100.4 F (38.0 C) or higher, or as directed by your healthcare provider    Symptoms are not better after 3 days of treatment    Back or belly pain that gets worse    Repeated vomiting, or unable to keep medicine down    Weakness or dizziness    Vaginal discharge    Pain, redness, or swelling in the outer vaginal area (labia)  [employer] last reviewed this educational content on 11/1/2019 2000-2021 [employer]. All rights reserved. This information is not intended as a substitute for professional medical care. Always follow your healthcare professional's instructions.           Patient Education     Vaginal Infection: Yeast (Candidiasis)  Yeast  infection occurs when yeast in the vagina increase and attacks the vaginal tissues. Yeast is a type of fungus. These infections are often caused by a type of yeast called Candida albicans. Other species of yeast can also cause infections. Factors that may make infection more likely include recent antibiotic use, douching, or increased sex. Yeast infections are more common in women who have diabetes, or are obese or pregnant, or have a weak immune system.   Symptoms of yeast infection    Clumpy or thin, white discharge, which may look like cottage cheese    No odor or minimal odor    Severe vaginal itching or burning    Burning with urination    Swelling, redness of vulva    Pain during sex    Treating yeast infection  Yeast infection is treated with a vaginal antifungal cream. In some cases, antifungal pills are prescribed instead. During treatment:     Finish all of your medicine, even if your symptoms go away.    Apply the cream before going to bed. Lie flat after applying so that it doesn't drip out.    Don't douche or use tampons.    Don't rely on a diaphragm or condoms, since the cream may weaken them.    Avoid intercourse if advised by your healthcare provider.  Should I treat a yeast infection myself?  Discuss with your healthcare provider whether you should use over-the-counter medicines to treat a yeast infection. Self-treatment may depend on whether:     You've had a yeast infection in the past.    You're at risk for sexually transmitted infections (STIs).  Call your healthcare provider if symptoms don't go away or come back after treatment.   [employer] last reviewed this educational content on 4/1/2020 2000-2021 [employer]. All rights reserved. This information is not intended as a substitute for professional medical care. Always follow your healthcare professional's instructions.

## 2021-09-25 ENCOUNTER — HEALTH MAINTENANCE LETTER (OUTPATIENT)
Age: 36
End: 2021-09-25

## 2021-11-21 ENCOUNTER — FCC EXTENDED DOCUMENTATION (OUTPATIENT)
Dept: PSYCHOLOGY | Facility: CLINIC | Age: 36
End: 2021-11-21
Payer: COMMERCIAL

## 2021-11-21 NOTE — PROGRESS NOTES
Discharge Summary  Multiple Sessions    Client Name: Rossy Nagel MRN#: [id number] YOB: 1985      Intake / Discharge Date: 4/27/2021; 11/21/2021      DSM5 Diagnoses: (Sustained by DSM5 Criteria Listed Above)  Diagnoses: 296.31 (F33.0) Major Depressive Disorder, Recurrent Episode, Mild _  300.02 (F41.1) Generalized Anxiety Disorder  Psychosocial & Contextual Factors: stress, organization  WHODAS 2.0 (12 item) Score:   WHODAS 2.0 Total Score 4/22/2021 5/12/2021   Total Score 31 25   Total Score MyChart 31 25               Presenting Concern:  Anxiety, organization      Reason for Discharge:  Client did not return      Disposition at Time of Last Encounter:   Comments:   No contact within the last 90 days       Risk Management:   Client has had a history of suicidal ideation: last experience 7  years ago, completed safety plan  A safety and risk management plan has been developed including: Patient consented to co-developed safety plan.  Safety and risk management plan was completed.  Patient agreed to use safety plan should any safety concerns arise.  A copy was given to the patient.      Referred To:  May return to [facility] for therapy services            JOSE Paredes   11/21/2021

## 2022-01-15 ENCOUNTER — HEALTH MAINTENANCE LETTER (OUTPATIENT)
Age: 37
End: 2022-01-15

## 2022-05-27 DIAGNOSIS — F33.1 MAJOR DEPRESSIVE DISORDER, RECURRENT EPISODE, MODERATE (H): ICD-10-CM

## 2022-05-27 DIAGNOSIS — F41.9 ANXIETY: ICD-10-CM

## 2022-05-31 NOTE — TELEPHONE ENCOUNTER
"Pending Prescriptions:                       Disp   Refills    sertraline (ZOLOFT) 100 MG tablet [Pharmac*90 tab*0        Sig: TAKE 1 TABLET BY MOUTH ONCE DAILY .    Routing refill request to provider for review/approval because:  PHQ-9 score:    PHQ 8/26/2021   PHQ-9 Total Score 10   Q9: Thoughts of better off dead/self-harm past 2 weeks Several days   F/U: Thoughts of suicide or self-harm -   F/U: Safety concerns -   Some encounter information is confidential and restricted. Go to Review Flowsheets activity to see all data.                 Requested Prescriptions   Pending Prescriptions Disp Refills    sertraline (ZOLOFT) 100 MG tablet [Pharmacy Med Name: Sertraline HCl 100 MG Oral Tablet] 90 tablet 0     Sig: TAKE 1 TABLET BY MOUTH ONCE DAILY .        SSRIs Protocol Failed - 5/27/2022  8:27 AM        Failed - PHQ-9 score less than 5 in past 6 months     Please review last PHQ-9 score.           Failed - Recent (6 mo) or future (30 days) visit within the authorizing provider's specialty     Patient had office visit in the last 6 months or has a visit in the next 30 days with authorizing provider or within the authorizing provider's specialty.  See \"Patient Info\" tab in inbasket, or \"Choose Columns\" in Meds & Orders section of the refill encounter.            Passed - Medication is active on med list        Passed - Patient is age 18 or older        Passed - No active pregnancy on record        Passed - No positive pregnancy test in last 12 months                    "

## 2022-06-01 RX ORDER — SERTRALINE HYDROCHLORIDE 100 MG/1
TABLET, FILM COATED ORAL
Qty: 30 TABLET | Refills: 0 | Status: SHIPPED | OUTPATIENT
Start: 2022-06-01 | End: 2022-06-26

## 2022-06-26 ENCOUNTER — E-VISIT (OUTPATIENT)
Dept: URGENT CARE | Facility: CLINIC | Age: 37
End: 2022-06-26
Payer: COMMERCIAL

## 2022-06-26 DIAGNOSIS — F33.1 MAJOR DEPRESSIVE DISORDER, RECURRENT EPISODE, MODERATE (H): ICD-10-CM

## 2022-06-26 DIAGNOSIS — F41.9 ANXIETY: ICD-10-CM

## 2022-06-26 PROCEDURE — 99421 OL DIG E/M SVC 5-10 MIN: CPT

## 2022-06-26 RX ORDER — SERTRALINE HYDROCHLORIDE 100 MG/1
100 TABLET, FILM COATED ORAL DAILY
Qty: 90 TABLET | Refills: 1 | Status: SHIPPED | OUTPATIENT
Start: 2022-06-26 | End: 2023-01-04

## 2022-06-26 ASSESSMENT — ANXIETY QUESTIONNAIRES
4. TROUBLE RELAXING: SEVERAL DAYS
7. FEELING AFRAID AS IF SOMETHING AWFUL MIGHT HAPPEN: SEVERAL DAYS
GAD7 TOTAL SCORE: 8
GAD7 TOTAL SCORE: 8
8. IF YOU CHECKED OFF ANY PROBLEMS, HOW DIFFICULT HAVE THESE MADE IT FOR YOU TO DO YOUR WORK, TAKE CARE OF THINGS AT HOME, OR GET ALONG WITH OTHER PEOPLE?: SOMEWHAT DIFFICULT
7. FEELING AFRAID AS IF SOMETHING AWFUL MIGHT HAPPEN: SEVERAL DAYS
6. BECOMING EASILY ANNOYED OR IRRITABLE: MORE THAN HALF THE DAYS
2. NOT BEING ABLE TO STOP OR CONTROL WORRYING: SEVERAL DAYS
1. FEELING NERVOUS, ANXIOUS, OR ON EDGE: SEVERAL DAYS
3. WORRYING TOO MUCH ABOUT DIFFERENT THINGS: SEVERAL DAYS
GAD7 TOTAL SCORE: 8
5. BEING SO RESTLESS THAT IT IS HARD TO SIT STILL: SEVERAL DAYS

## 2022-06-26 ASSESSMENT — PATIENT HEALTH QUESTIONNAIRE - PHQ9
10. IF YOU CHECKED OFF ANY PROBLEMS, HOW DIFFICULT HAVE THESE PROBLEMS MADE IT FOR YOU TO DO YOUR WORK, TAKE CARE OF THINGS AT HOME, OR GET ALONG WITH OTHER PEOPLE: SOMEWHAT DIFFICULT
SUM OF ALL RESPONSES TO PHQ QUESTIONS 1-9: 2
SUM OF ALL RESPONSES TO PHQ QUESTIONS 1-9: 2

## 2022-06-27 ASSESSMENT — PATIENT HEALTH QUESTIONNAIRE - PHQ9: SUM OF ALL RESPONSES TO PHQ QUESTIONS 1-9: 2

## 2022-06-27 ASSESSMENT — ANXIETY QUESTIONNAIRES: GAD7 TOTAL SCORE: 8

## 2022-11-16 ENCOUNTER — OFFICE VISIT (OUTPATIENT)
Dept: URGENT CARE | Facility: URGENT CARE | Age: 37
End: 2022-11-16
Payer: COMMERCIAL

## 2022-11-16 VITALS
TEMPERATURE: 99.9 F | HEART RATE: 82 BPM | SYSTOLIC BLOOD PRESSURE: 130 MMHG | OXYGEN SATURATION: 98 % | DIASTOLIC BLOOD PRESSURE: 88 MMHG

## 2022-11-16 DIAGNOSIS — H66.003 ACUTE SUPPURATIVE OTITIS MEDIA OF BOTH EARS WITHOUT SPONTANEOUS RUPTURE OF TYMPANIC MEMBRANES, RECURRENCE NOT SPECIFIED: ICD-10-CM

## 2022-11-16 DIAGNOSIS — J11.1 INFLUENZA-LIKE ILLNESS: Primary | ICD-10-CM

## 2022-11-16 DIAGNOSIS — R07.0 THROAT PAIN: ICD-10-CM

## 2022-11-16 LAB
DEPRECATED S PYO AG THROAT QL EIA: NEGATIVE
GROUP A STREP BY PCR: NOT DETECTED

## 2022-11-16 PROCEDURE — 99213 OFFICE O/P EST LOW 20 MIN: CPT | Performed by: PHYSICIAN ASSISTANT

## 2022-11-16 PROCEDURE — 87651 STREP A DNA AMP PROBE: CPT | Performed by: PHYSICIAN ASSISTANT

## 2022-11-16 RX ORDER — AMOXICILLIN 875 MG
875 TABLET ORAL 2 TIMES DAILY
Qty: 20 TABLET | Refills: 0 | Status: SHIPPED | OUTPATIENT
Start: 2022-11-16 | End: 2022-11-26

## 2022-11-16 ASSESSMENT — ENCOUNTER SYMPTOMS
DIARRHEA: 0
EYES NEGATIVE: 1
WEAKNESS: 0
WOUND: 0
PALPITATIONS: 0
HEADACHES: 0
COUGH: 1
LIGHT-HEADEDNESS: 0
NAUSEA: 0
NECK STIFFNESS: 0
SORE THROAT: 1
MYALGIAS: 0
JOINT SWELLING: 0
VOMITING: 0
BACK PAIN: 0
ENDOCRINE NEGATIVE: 1
ALLERGIC/IMMUNOLOGIC NEGATIVE: 1
ARTHRALGIAS: 0
MUSCULOSKELETAL NEGATIVE: 1
RHINORRHEA: 0
FEVER: 0
NECK PAIN: 0
SHORTNESS OF BREATH: 0
DIZZINESS: 0
CHILLS: 0
CARDIOVASCULAR NEGATIVE: 1

## 2022-11-16 NOTE — ADDENDUM NOTE
Addended by: TARIQ MCLAIN on: 11/16/2022 03:39 PM     Modules accepted: Orders, Level of Service

## 2022-11-16 NOTE — PROGRESS NOTES
Chief Complaint:     Chief Complaint   Patient presents with     Pharyngitis     Sore throat, sinus pressure/congestion, fatigue but cant sleep, fever started on Saturday. Rotating tylenol and ibuprofen, nothing today.       Results for orders placed or performed in visit on 11/16/22   Streptococcus A Rapid Screen w/Reflex to PCR - Clinic Collect     Status: Normal    Specimen: Throat; Swab   Result Value Ref Range    Group A Strep antigen Negative Negative       Medical Decision Making:    Vital signs reviewed by Lui Irwin PA-C  /88 (BP Location: Right arm, Patient Position: Sitting, Cuff Size: Adult Regular)   Pulse 82   Temp 99.9  F (37.7  C) (Tympanic)   SpO2 98%     Differential Diagnosis:  URI Adult/Peds:  Bronchitis-viral, Influenza, Pneumonia, Sinusitis, Strep pharyngitis, Tonsilitis, Viral pharyngitis, Viral syndrome and Viral upper respiratory illness        ASSESSMENT    1. Influenza-like illness    2. Throat pain    3. Acute suppurative otitis media of both ears without spontaneous rupture of tympanic membranes, recurrence not specified        PLAN    Patient is in no acute distress.    Temp is 99.9 in clinic today, lung sounds were clear, and O2 sats at 98% on RA.    RST was negative.  We will call with PCR results only if positive.  Rx for Amoxicillin for ear infection.  Rest, Push fluids, vaporizer, elevation of head of bed.  Ibuprofen and or Tylenol for any fever or body aches.  Over the counter cough suppressant- PRN- as discussed.   If symptoms worsen, recheck immediately otherwise follow up with your PCP in 1 week if symptoms are not improving.  Worrisome symptoms discussed with instructions to go to the ED.  Patient verbalized understanding and agreed with this plan.    Labs:    Results for orders placed or performed in visit on 11/16/22   Streptococcus A Rapid Screen w/Reflex to PCR - Clinic Collect     Status: Normal    Specimen: Throat; Swab   Result Value Ref Range    Group A  Strep antigen Negative Negative        Vital signs reviewed by Lui Irwin PA-C  /88 (BP Location: Right arm, Patient Position: Sitting, Cuff Size: Adult Regular)   Pulse 82   Temp 99.9  F (37.7  C) (Tympanic)   SpO2 98%     Current Meds      Current Outpatient Medications:      amoxicillin (AMOXIL) 875 MG tablet, Take 1 tablet (875 mg) by mouth 2 times daily for 10 days, Disp: 20 tablet, Rfl: 0     loratadine (CLARITIN) 10 MG tablet, Take 1 tablet (10 mg) by mouth daily, Disp: 90 tablet, Rfl: 1     sertraline (ZOLOFT) 100 MG tablet, Take 1 tablet (100 mg) by mouth daily, Disp: 90 tablet, Rfl: 1     paragard intrauterine copper, 1 each by Intrauterine route once, Disp: , Rfl:       Respiratory History    occasional episodes of bronchitis      SUBJECTIVE    HPI: Rossy Nagel is an 37 year old female who presents with chest congestion, cough nonproductive, occasional, nasal congestion and sore throat.  Symptoms began 4  days ago and has unchanged.  There is no shortness of breath, wheezing and chest pain.  Patient is eating and drinking well.  No fever, nausea, vomiting, or diarrhea.    Patient denies any recent travel or exposure to known COVID positive tested individual.      ROS:     Review of Systems   Constitutional: Negative for chills and fever.   HENT: Positive for congestion and sore throat. Negative for ear pain and rhinorrhea.    Eyes: Negative.    Respiratory: Positive for cough. Negative for shortness of breath.    Cardiovascular: Negative.  Negative for chest pain and palpitations.   Gastrointestinal: Negative for diarrhea, nausea and vomiting.   Endocrine: Negative.    Genitourinary: Negative.    Musculoskeletal: Negative.  Negative for arthralgias, back pain, joint swelling, myalgias, neck pain and neck stiffness.   Skin: Negative.  Negative for rash and wound.   Allergic/Immunologic: Negative.  Negative for immunocompromised state.   Neurological: Negative for dizziness, weakness,  light-headedness and headaches.         Family History   Family History   Problem Relation Age of Onset     Diabetes Mother         Type II     Depression Mother      Obesity Mother         overweight     Hypertension Mother      Breast Cancer Mother      Hypertension Maternal Grandmother      Heart Disease Maternal Grandfather         Bypass     Hypertension Maternal Grandfather      Cancer Paternal Grandmother         ovarian or uterine     Asthma Brother         Problem history  Patient Active Problem List   Diagnosis     CARDIOVASCULAR SCREENING; LDL GOAL LESS THAN 160     Major depressive disorder, recurrent episode, moderate (H)     Anxiety     IUD (intrauterine device) in place        Allergies  Allergies   Allergen Reactions     Nkda [No Known Drug Allergies]         Social History  Social History     Socioeconomic History     Marital status:      Spouse name: Not on file     Number of children: Not on file     Years of education: Not on file     Highest education level: Not on file   Occupational History     Not on file   Tobacco Use     Smoking status: Never     Smokeless tobacco: Never   Substance and Sexual Activity     Alcohol use: Yes     Comment: Rarely     Drug use: No     Sexual activity: Yes     Partners: Male     Birth control/protection: I.U.D.   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 No   Social History Narrative     to Van and lives in [place].  No smokers in the home.  Advised about toxoplasmosis precautions.  No  concerns about domestic violence.     Social Determinants of Health     Financial Resource Strain: Not on file   Food Insecurity: Not on file   Transportation Needs: Not on file   Physical Activity: Not on file   Stress: Not on file   Social Connections: Not on file   Intimate Partner Violence: Not on file   Housing Stability: Not on file        OBJECTIVE     Vital signs reviewed by Lui Irwin PA-C  /88 (BP Location: Right arm, Patient Position: Sitting, Cuff Size: Adult Regular)   Pulse 82   Temp 99.9  F (37.7  C) (Tympanic)   SpO2 98%      Physical Exam  Vitals and nursing note reviewed.   Constitutional:       General: She is not in acute distress.     Appearance: She is well-developed. She is not ill-appearing, toxic-appearing or diaphoretic.   HENT:      Head: Normocephalic and atraumatic.      Right Ear: Hearing, ear canal and external ear normal. Tympanic membrane is erythematous. Tympanic membrane is not perforated, retracted or bulging.      Left Ear: Hearing, ear canal and external ear normal. Tympanic membrane is erythematous and bulging. Tympanic membrane is not perforated or retracted.      Nose: Congestion present. No mucosal edema or rhinorrhea.      Right Sinus: No maxillary sinus tenderness or frontal sinus tenderness.      Left Sinus: No maxillary sinus tenderness or frontal sinus tenderness.      Mouth/Throat:      Pharynx: Posterior oropharyngeal erythema present. No pharyngeal swelling, oropharyngeal exudate or uvula swelling.      Tonsils: No tonsillar exudate or tonsillar abscesses. 0 on the right. 0 on the left.   Eyes:      General:         Right eye: No discharge.         Left eye: No discharge.      Pupils: Pupils are equal, round, and reactive to light.   Cardiovascular:      Rate and Rhythm: Normal rate and regular rhythm.      Heart sounds: Normal heart sounds. No murmur heard.    No friction rub. No gallop.   Pulmonary:      Effort: Pulmonary effort is normal. No  respiratory distress.      Breath sounds: Normal breath sounds. No decreased breath sounds, wheezing, rhonchi or rales.   Chest:      Chest wall: No tenderness.   Abdominal:      General: Bowel sounds are normal. There is no distension.      Palpations: Abdomen is soft. There is no mass.      Tenderness: There is no abdominal tenderness. There is no guarding.   Musculoskeletal:      Cervical back: Normal range of motion and neck supple.   Lymphadenopathy:      Head:      Right side of head: No submental, submandibular, tonsillar, preauricular or posterior auricular adenopathy.      Left side of head: No submental, submandibular, tonsillar, preauricular or posterior auricular adenopathy.      Cervical: No cervical adenopathy.      Right cervical: No superficial or posterior cervical adenopathy.     Left cervical: No superficial or posterior cervical adenopathy.   Skin:     General: Skin is warm and dry.      Findings: No rash.   Neurological:      Mental Status: She is alert and oriented to person, place, and time.      Cranial Nerves: No cranial nerve deficit.      Deep Tendon Reflexes: Reflexes are normal and symmetric.   Psychiatric:         Behavior: Behavior normal. Behavior is cooperative.         Thought Content: Thought content normal.         Judgment: Judgment normal.           Lui Irwin PA-C  11/16/2022, 3:29 PM

## 2023-01-04 DIAGNOSIS — F33.1 MAJOR DEPRESSIVE DISORDER, RECURRENT EPISODE, MODERATE (H): ICD-10-CM

## 2023-01-04 DIAGNOSIS — F41.9 ANXIETY: ICD-10-CM

## 2023-01-04 RX ORDER — SERTRALINE HYDROCHLORIDE 100 MG/1
TABLET, FILM COATED ORAL
Qty: 90 TABLET | Refills: 0 | Status: SHIPPED | OUTPATIENT
Start: 2023-01-04 | End: 2023-04-11

## 2023-01-04 NOTE — TELEPHONE ENCOUNTER
"Pending Prescriptions:                       Disp   Refills    sertraline (ZOLOFT) 100 MG tablet [Pharmac*90 tab*0        Sig: Take 1 tablet by mouth once daily    Routing refill request to provider for review/approval because:  Patient needs to be seen because:  Mood med follow up  PHQ-9 score:    PHQ 6/26/2022   PHQ-9 Total Score 2   Q9: Thoughts of better off dead/self-harm past 2 weeks Not at all   F/U: Thoughts of suicide or self-harm -   F/U: Safety concerns -   Some encounter information is confidential and restricted. Go to Review Flowsheets activity to see all data.       Requested Prescriptions   Pending Prescriptions Disp Refills    sertraline (ZOLOFT) 100 MG tablet [Pharmacy Med Name: Sertraline HCl 100 MG Oral Tablet] 90 tablet 0     Sig: Take 1 tablet by mouth once daily       SSRIs Protocol Failed - 1/4/2023 10:29 AM        Failed - PHQ-9 score less than 5 in past 6 months     Please review last PHQ-9 score.           Failed - Recent (6 mo) or future (30 days) visit within the authorizing provider's specialty     Patient had office visit in the last 6 months or has a visit in the next 30 days with authorizing provider or within the authorizing provider's specialty.  See \"Patient Info\" tab in inbasket, or \"Choose Columns\" in Meds & Orders section of the refill encounter.            Passed - Medication is active on med list        Passed - Patient is age 18 or older        Passed - No active pregnancy on record        Passed - No positive pregnancy test in last 12 months             "

## 2023-01-05 NOTE — TELEPHONE ENCOUNTER
[employer] message sent. Postponing to see if the patient views message.     Savanna Carlos

## 2023-01-06 ENCOUNTER — E-VISIT (OUTPATIENT)
Dept: FAMILY MEDICINE | Facility: OTHER | Age: 38
End: 2023-01-06
Payer: COMMERCIAL

## 2023-01-06 DIAGNOSIS — F33.1 MAJOR DEPRESSIVE DISORDER, RECURRENT EPISODE, MODERATE (H): Primary | ICD-10-CM

## 2023-01-06 PROCEDURE — 99207 PR NON-BILLABLE SERV PER CHARTING: CPT | Performed by: FAMILY MEDICINE

## 2023-01-06 ASSESSMENT — ANXIETY QUESTIONNAIRES
7. FEELING AFRAID AS IF SOMETHING AWFUL MIGHT HAPPEN: SEVERAL DAYS
2. NOT BEING ABLE TO STOP OR CONTROL WORRYING: SEVERAL DAYS
8. IF YOU CHECKED OFF ANY PROBLEMS, HOW DIFFICULT HAVE THESE MADE IT FOR YOU TO DO YOUR WORK, TAKE CARE OF THINGS AT HOME, OR GET ALONG WITH OTHER PEOPLE?: SOMEWHAT DIFFICULT
GAD7 TOTAL SCORE: 7
5. BEING SO RESTLESS THAT IT IS HARD TO SIT STILL: NOT AT ALL
7. FEELING AFRAID AS IF SOMETHING AWFUL MIGHT HAPPEN: SEVERAL DAYS
GAD7 TOTAL SCORE: 7
GAD7 TOTAL SCORE: 7
4. TROUBLE RELAXING: SEVERAL DAYS
6. BECOMING EASILY ANNOYED OR IRRITABLE: MORE THAN HALF THE DAYS
1. FEELING NERVOUS, ANXIOUS, OR ON EDGE: SEVERAL DAYS
3. WORRYING TOO MUCH ABOUT DIFFERENT THINGS: SEVERAL DAYS

## 2023-01-07 ENCOUNTER — HEALTH MAINTENANCE LETTER (OUTPATIENT)
Age: 38
End: 2023-01-07

## 2023-01-07 ASSESSMENT — PATIENT HEALTH QUESTIONNAIRE - PHQ9: SUM OF ALL RESPONSES TO PHQ QUESTIONS 1-9: 7

## 2023-01-07 ASSESSMENT — ANXIETY QUESTIONNAIRES: GAD7 TOTAL SCORE: 7

## 2023-04-22 ENCOUNTER — HEALTH MAINTENANCE LETTER (OUTPATIENT)
Age: 38
End: 2023-04-22

## 2023-06-08 ENCOUNTER — OFFICE VISIT (OUTPATIENT)
Dept: FAMILY MEDICINE | Facility: OTHER | Age: 38
End: 2023-06-08
Payer: COMMERCIAL

## 2023-06-08 VITALS
SYSTOLIC BLOOD PRESSURE: 134 MMHG | DIASTOLIC BLOOD PRESSURE: 91 MMHG | TEMPERATURE: 97.8 F | HEIGHT: 61 IN | RESPIRATION RATE: 16 BRPM | WEIGHT: 167 LBS | OXYGEN SATURATION: 99 % | HEART RATE: 82 BPM | BODY MASS INDEX: 31.53 KG/M2

## 2023-06-08 DIAGNOSIS — Z12.4 CERVICAL CANCER SCREENING: ICD-10-CM

## 2023-06-08 DIAGNOSIS — E66.09 CLASS 1 OBESITY DUE TO EXCESS CALORIES WITHOUT SERIOUS COMORBIDITY WITH BODY MASS INDEX (BMI) OF 31.0 TO 31.9 IN ADULT: ICD-10-CM

## 2023-06-08 DIAGNOSIS — F41.9 ANXIETY: ICD-10-CM

## 2023-06-08 DIAGNOSIS — E66.811 CLASS 1 OBESITY DUE TO EXCESS CALORIES WITHOUT SERIOUS COMORBIDITY WITH BODY MASS INDEX (BMI) OF 31.0 TO 31.9 IN ADULT: ICD-10-CM

## 2023-06-08 DIAGNOSIS — F33.1 MAJOR DEPRESSIVE DISORDER, RECURRENT EPISODE, MODERATE (H): ICD-10-CM

## 2023-06-08 DIAGNOSIS — Z11.59 NEED FOR HEPATITIS C SCREENING TEST: ICD-10-CM

## 2023-06-08 DIAGNOSIS — Z00.00 ROUTINE GENERAL MEDICAL EXAMINATION AT A HEALTH CARE FACILITY: Primary | ICD-10-CM

## 2023-06-08 LAB
CHOLEST SERPL-MCNC: 239 MG/DL
ERYTHROCYTE [DISTWIDTH] IN BLOOD BY AUTOMATED COUNT: 13.3 % (ref 10–15)
HBA1C MFR BLD: 5.7 % (ref 0–5.6)
HCT VFR BLD AUTO: 40.5 % (ref 35–47)
HDLC SERPL-MCNC: 42 MG/DL
HGB BLD-MCNC: 13 G/DL (ref 11.7–15.7)
LDLC SERPL CALC-MCNC: 165 MG/DL
MCH RBC QN AUTO: 28.1 PG (ref 26.5–33)
MCHC RBC AUTO-ENTMCNC: 32.1 G/DL (ref 31.5–36.5)
MCV RBC AUTO: 88 FL (ref 78–100)
NONHDLC SERPL-MCNC: 197 MG/DL
PLATELET # BLD AUTO: 382 10E3/UL (ref 150–450)
RBC # BLD AUTO: 4.62 10E6/UL (ref 3.8–5.2)
TRIGL SERPL-MCNC: 161 MG/DL
TSH SERPL DL<=0.005 MIU/L-ACNC: 3.02 UIU/ML (ref 0.3–4.2)
WBC # BLD AUTO: 9.5 10E3/UL (ref 4–11)

## 2023-06-08 PROCEDURE — 85027 COMPLETE CBC AUTOMATED: CPT | Performed by: FAMILY MEDICINE

## 2023-06-08 PROCEDURE — 84443 ASSAY THYROID STIM HORMONE: CPT | Performed by: FAMILY MEDICINE

## 2023-06-08 PROCEDURE — 80061 LIPID PANEL: CPT | Performed by: FAMILY MEDICINE

## 2023-06-08 PROCEDURE — 99214 OFFICE O/P EST MOD 30 MIN: CPT | Mod: 25 | Performed by: FAMILY MEDICINE

## 2023-06-08 PROCEDURE — 83036 HEMOGLOBIN GLYCOSYLATED A1C: CPT | Performed by: FAMILY MEDICINE

## 2023-06-08 PROCEDURE — G0124 SCREEN C/V THIN LAYER BY MD: HCPCS | Performed by: PATHOLOGY

## 2023-06-08 PROCEDURE — 90715 TDAP VACCINE 7 YRS/> IM: CPT | Performed by: FAMILY MEDICINE

## 2023-06-08 PROCEDURE — 96127 BRIEF EMOTIONAL/BEHAV ASSMT: CPT | Performed by: FAMILY MEDICINE

## 2023-06-08 PROCEDURE — 90472 IMMUNIZATION ADMIN EACH ADD: CPT | Performed by: FAMILY MEDICINE

## 2023-06-08 PROCEDURE — 86803 HEPATITIS C AB TEST: CPT | Performed by: FAMILY MEDICINE

## 2023-06-08 PROCEDURE — 36415 COLL VENOUS BLD VENIPUNCTURE: CPT | Performed by: FAMILY MEDICINE

## 2023-06-08 PROCEDURE — 90471 IMMUNIZATION ADMIN: CPT | Performed by: FAMILY MEDICINE

## 2023-06-08 PROCEDURE — G0145 SCR C/V CYTO,THINLAYER,RESCR: HCPCS | Performed by: FAMILY MEDICINE

## 2023-06-08 PROCEDURE — 99395 PREV VISIT EST AGE 18-39: CPT | Mod: 25 | Performed by: FAMILY MEDICINE

## 2023-06-08 PROCEDURE — 87624 HPV HI-RISK TYP POOLED RSLT: CPT | Performed by: FAMILY MEDICINE

## 2023-06-08 PROCEDURE — 90746 HEPB VACCINE 3 DOSE ADULT IM: CPT | Performed by: FAMILY MEDICINE

## 2023-06-08 RX ORDER — SERTRALINE HYDROCHLORIDE 100 MG/1
150 TABLET, FILM COATED ORAL DAILY
Qty: 135 TABLET | Refills: 0 | Status: SHIPPED | OUTPATIENT
Start: 2023-06-08 | End: 2023-06-28

## 2023-06-08 ASSESSMENT — ENCOUNTER SYMPTOMS
PALPITATIONS: 0
NERVOUS/ANXIOUS: 0
MYALGIAS: 0
SHORTNESS OF BREATH: 0
HEMATURIA: 0
FREQUENCY: 0
HEMATOCHEZIA: 0
FEVER: 0
BREAST MASS: 0
HEARTBURN: 0
SORE THROAT: 0
ABDOMINAL PAIN: 0
PARESTHESIAS: 0
CHILLS: 0
DIARRHEA: 0
CONSTIPATION: 1
DIZZINESS: 0
NAUSEA: 0
JOINT SWELLING: 0
ARTHRALGIAS: 0
HEADACHES: 0
WEAKNESS: 0
DYSURIA: 0
EYE PAIN: 0
COUGH: 0

## 2023-06-08 ASSESSMENT — PATIENT HEALTH QUESTIONNAIRE - PHQ9
SUM OF ALL RESPONSES TO PHQ QUESTIONS 1-9: 15
10. IF YOU CHECKED OFF ANY PROBLEMS, HOW DIFFICULT HAVE THESE PROBLEMS MADE IT FOR YOU TO DO YOUR WORK, TAKE CARE OF THINGS AT HOME, OR GET ALONG WITH OTHER PEOPLE: EXTREMELY DIFFICULT
SUM OF ALL RESPONSES TO PHQ QUESTIONS 1-9: 15

## 2023-06-08 ASSESSMENT — PAIN SCALES - GENERAL: PAINLEVEL: MILD PAIN (2)

## 2023-06-08 NOTE — PROGRESS NOTES
"   SUBJECTIVE:   CC: Sabrina is an 37 year old who presents for preventive health visit.       6/8/2023    11:07 AM   Additional Questions   Roomed by SALMA   Accompanied by ADILIA         6/8/2023    11:07 AM   Patient Reported Additional Medications   Patient reports taking the following new medications None     Healthy Habits:     Getting at least 3 servings of Calcium per day:  NO    Bi-annual eye exam:  NO    Dental care twice a year:  NO    Sleep apnea or symptoms of sleep apnea:  Daytime drowsiness    Diet:  Gluten-free/reduced    Frequency of exercise:  2-3 days/week    Duration of exercise:  15-30 minutes    Taking medications regularly:  Yes    Medication side effects:  None    PHQ-2 Total Score: 3    Additional concerns today:  Yes        Depression Followup    How are you doing with your depression since your last visit? Worsened, \"It is the worst of the worst prior getting my period\"    Are you having other symptoms that might be associated with depression? Yes:  \"feel like I'm having brain zaps and withdrawl symptoms. Seems worse before my period and when I'm tired.\"    Have you had a significant life event?  Grief or Loss, mother in law passed last August and  got a new job     Are you feeling anxious or having panic attacks?   No    Do you have any concerns with your use of alcohol or other drugs? No    Social History     Tobacco Use     Smoking status: Never     Smokeless tobacco: Never   Vaping Use     Vaping status: Never Used   Substance Use Topics     Alcohol use: Yes     Comment: Rarely     Drug use: No         6/26/2022    12:10 PM 1/6/2023     9:44 AM 6/8/2023    11:01 AM   PHQ   PHQ-9 Total Score 2 7 15   Q9: Thoughts of better off dead/self-harm past 2 weeks Not at all Not at all More than half the days   F/U: Thoughts of suicide or self-harm   No   F/U: Safety concerns   No         8/26/2021    11:46 AM 6/26/2022    12:10 PM 1/6/2023     9:44 AM   PENNY-7 SCORE   Total Score  8 (mild anxiety) 7 " (mild anxiety)   Total Score 6 8 7      Answers for HPI/ROS submitted by the patient on 6/8/2023  If you checked off any problems, how difficult have these problems made it for you to do your work, take care of things at home, or get along with other people?: Extremely difficult  PHQ9 TOTAL SCORE: 15        Suicide Assessment Five-step Evaluation and Treatment (SAFE-T)      Have you ever done Advance Care Planning? (For example, a Health Directive, POLST, or a discussion with a medical provider or your loved ones about your wishes): No, advance care planning information given to patient to review.  Advanced care planning was discussed at today's visit.    Social History     Tobacco Use     Smoking status: Never     Smokeless tobacco: Never   Vaping Use     Vaping status: Never Used   Substance Use Topics     Alcohol use: Yes     Comment: Rarely             6/8/2023    11:03 AM   Alcohol Use   Prescreen: >3 drinks/day or >7 drinks/week? No     Reviewed orders with patient.  Reviewed health maintenance and updated orders accordingly - Yes  Lab work is in process    Breast Cancer Screening:    FHS-7:       6/8/2023    11:04 AM   Breast CA Risk Assessment (FHS-7)   Did any of your first-degree relatives have breast or ovarian cancer? Yes   Did any of your relatives have bilateral breast cancer? Unknown   Did any man in your family have breast cancer? No   Did any woman in your family have breast and ovarian cancer? Yes   Did any woman in your family have breast cancer before age 50 y? Unknown   Do you have 2 or more relatives with breast and/or ovarian cancer? No   Do you have 2 or more relatives with breast and/or bowel cancer? No       Patient under 40 years of age: Routine Mammogram Screening not recommended.   Pertinent mammograms are reviewed under the imaging tab.    History of abnormal Pap smear: NO - age 30-65 PAP every 5 years with negative HPV co-testing recommended      Latest Ref Rng & Units 2/9/2017      9:18 AM 2/9/2017     8:52 AM 7/14/2014    12:00 AM   PAP / HPV   PAP (Historical)   NIL   NIL     HPV 16 DNA NEG Negative       HPV 18 DNA NEG Negative       Other HR HPV NEG Negative         Reviewed and updated as needed this visit by clinical staff   Tobacco  Allergies  Meds  Problems  Med Hx  Surg Hx  Fam Hx          Reviewed and updated as needed this visit by Provider   Tobacco  Allergies  Meds  Problems  Med Hx  Surg Hx  Fam Hx         Prior to immunization administration, verified patients identity using patient s name and date of birth. Please see Immunization Activity for additional information.     Screening Questionnaire for Adult Immunization    Are you sick today?   No   Do you have allergies to medications, food, a vaccine component or latex?   No   Have you ever had a serious reaction after receiving a vaccination?   No   Do you have a long-term health problem with heart, lung, kidney, or metabolic disease (e.g., diabetes), asthma, a blood disorder, no spleen, complement component deficiency, a cochlear implant, or a spinal fluid leak?  Are you on long-term aspirin therapy?   No   Do you have cancer, leukemia, HIV/AIDS, or any other immune system problem?   No   Do you have a parent, brother, or sister with an immune system problem?   No   In the past 3 months, have you taken medications that affect  your immune system, such as prednisone, other steroids, or anticancer drugs; drugs for the treatment of rheumatoid arthritis, Crohn s disease, or psoriasis; or have you had radiation treatments?   No   Have you had a seizure, or a brain or other nervous system problem?   No   During the past year, have you received a transfusion of blood or blood    products, or been given immune (gamma) globulin or antiviral drug?   No   For women: Are you pregnant or is there a chance you could become       pregnant during the next month?   No   Have you received any vaccinations in the past 4 weeks?   No  "    Immunization questionnaire answers were all negative.      Injection of HepB and TDAP given by Beverly Santamaria MA. Patient instructed to remain in clinic for 15 minutes afterwards, and to report any adverse reactions.     Screening performed by Beverly Santamaria MA on 6/8/2023 at 11:37 AM.        Review of Systems   Constitutional: Negative for chills and fever.   HENT: Negative for congestion, ear pain, hearing loss and sore throat.    Eyes: Negative for pain and visual disturbance.   Respiratory: Negative for cough and shortness of breath.    Cardiovascular: Negative for chest pain, palpitations and peripheral edema.   Gastrointestinal: Positive for constipation. Negative for abdominal pain, diarrhea, heartburn, hematochezia and nausea.   Breasts:  Negative for tenderness, breast mass and discharge.   Genitourinary: Negative for dysuria, frequency, genital sores, hematuria, pelvic pain, urgency, vaginal bleeding and vaginal discharge.   Musculoskeletal: Negative for arthralgias, joint swelling and myalgias.   Skin: Negative for rash.   Neurological: Negative for dizziness, weakness, headaches and paresthesias.   Psychiatric/Behavioral: Positive for mood changes. The patient is not nervous/anxious.           OBJECTIVE:   BP (!) 134/91   Pulse 82   Temp 97.8  F (36.6  C) (Temporal)   Resp 16   Ht 1.559 m (5' 1.38\")   Wt 75.8 kg (167 lb)   LMP 06/04/2023   SpO2 99%   BMI 31.17 kg/m    Physical Exam  GENERAL: healthy, alert and no distress  RESP: lungs clear to auscultation - no rales, rhonchi or wheezes  BREAST: normal without masses, tenderness or nipple discharge and no palpable axillary masses or adenopathy  CV: regular rate and rhythm, normal S1 S2, no S3 or S4, no murmur, click or rub, no peripheral edema and peripheral pulses strong  ABDOMEN: soft, nontender, no hepatosplenomegaly, no masses and bowel sounds normal  MS: no gross musculoskeletal defects noted, no edema  SKIN: no suspicious lesions or " rashes  NEURO: Normal strength and tone, mentation intact and speech normal  PSYCH: mentation appears normal, affect normal/bright        ASSESSMENT/PLAN:       ICD-10-CM    1. Routine general medical examination at a health care facility  Z00.00 Lipid panel reflex to direct LDL Non-fasting     Hemoglobin A1c     Lipid panel reflex to direct LDL Non-fasting     Hemoglobin A1c      2. Anxiety  F41.9 sertraline (ZOLOFT) 100 MG tablet     Adult Mental Health  Referral     TSH with free T4 reflex     CBC with platelets     TSH with free T4 reflex     CBC with platelets      3. Major depressive disorder, recurrent episode, moderate (H)  F33.1 sertraline (ZOLOFT) 100 MG tablet     Adult Mental Health  Referral     TSH with free T4 reflex     CBC with platelets     TSH with free T4 reflex     CBC with platelets      4. Class 1 obesity due to excess calories without serious comorbidity with body mass index (BMI) of 31.0 to 31.9 in adult  E66.09 Lipid panel reflex to direct LDL Non-fasting    Z68.31 Hemoglobin A1c     Lipid panel reflex to direct LDL Non-fasting     Hemoglobin A1c      5. Need for hepatitis C screening test  Z11.59 Hepatitis C Screen Reflex to HCV RNA Quant and Genotype     Hepatitis C Screen Reflex to HCV RNA Quant and Genotype      6. Cervical cancer screening  Z12.4 Pap Screen with HPV - recommended age 30 - 65 years        Patient has been having worsening of anxiety and she thought she had been doing well enough to potentially wean down her medications just a couple of months ago.  We did offer to test her for some of the common mimics including thyroid and cholesterol to see if there may have been something else that triggered this.  The upon talking with her we did discover that it she had major life events near this time that she did not think were any big deal or that she was affected by them as we talked a little bit about stuffing down her emotions and being more mindful of what  she is feeling.  I do think counseling could be significantly helpful for her given the history we are finding.  I certainly can increase her sertraline again as well to see if this helps but she is agreeable to trying counseling as she also feels this could be helpful.  Referral was placed.  Follow-up in 2 to 3 weeks is needed as her blood pressure is borderline high which she thinks is just stress related but we need to be keeping an eye on this.      Patient has been advised of split billing requirements and indicates understanding: Yes      COUNSELING:  Reviewed preventive health counseling, as reflected in patient instructions       Regular exercise       Healthy diet/nutrition        She reports that she has never smoked. She has never used smokeless tobacco.      Carmita Ramos MD, MD  [facility]

## 2023-06-09 LAB — HCV AB SERPL QL IA: NONREACTIVE

## 2023-06-13 LAB
BKR LAB AP GYN ADEQUACY: ABNORMAL
BKR LAB AP GYN INTERPRETATION: ABNORMAL
BKR LAB AP HPV REFLEX: ABNORMAL
BKR LAB AP LMP: ABNORMAL
BKR LAB AP PREVIOUS ABNORMAL: ABNORMAL
PATH REPORT.COMMENTS IMP SPEC: ABNORMAL
PATH REPORT.COMMENTS IMP SPEC: ABNORMAL
PATH REPORT.RELEVANT HX SPEC: ABNORMAL

## 2023-06-14 LAB
HUMAN PAPILLOMA VIRUS 16 DNA: NEGATIVE
HUMAN PAPILLOMA VIRUS 18 DNA: NEGATIVE
HUMAN PAPILLOMA VIRUS FINAL DIAGNOSIS: NORMAL
HUMAN PAPILLOMA VIRUS OTHER HR: NEGATIVE

## 2023-06-15 PROBLEM — R87.610 ASCUS OF CERVIX WITH NEGATIVE HIGH RISK HPV: Status: ACTIVE | Noted: 2023-06-08

## 2023-06-28 ENCOUNTER — OFFICE VISIT (OUTPATIENT)
Dept: FAMILY MEDICINE | Facility: OTHER | Age: 38
End: 2023-06-28
Payer: COMMERCIAL

## 2023-06-28 VITALS
DIASTOLIC BLOOD PRESSURE: 76 MMHG | TEMPERATURE: 97 F | OXYGEN SATURATION: 99 % | BODY MASS INDEX: 30.87 KG/M2 | SYSTOLIC BLOOD PRESSURE: 110 MMHG | HEIGHT: 61 IN | HEART RATE: 65 BPM | WEIGHT: 163.5 LBS | RESPIRATION RATE: 16 BRPM

## 2023-06-28 DIAGNOSIS — R06.83 SNORING: ICD-10-CM

## 2023-06-28 DIAGNOSIS — F41.9 ANXIETY: Primary | ICD-10-CM

## 2023-06-28 DIAGNOSIS — F33.1 MAJOR DEPRESSIVE DISORDER, RECURRENT EPISODE, MODERATE (H): ICD-10-CM

## 2023-06-28 PROCEDURE — 99213 OFFICE O/P EST LOW 20 MIN: CPT | Performed by: FAMILY MEDICINE

## 2023-06-28 RX ORDER — SERTRALINE HYDROCHLORIDE 100 MG/1
150 TABLET, FILM COATED ORAL DAILY
Qty: 135 TABLET | Refills: 1 | Status: SHIPPED | OUTPATIENT
Start: 2023-06-28 | End: 2023-12-06

## 2023-06-28 ASSESSMENT — ANXIETY QUESTIONNAIRES
8. IF YOU CHECKED OFF ANY PROBLEMS, HOW DIFFICULT HAVE THESE MADE IT FOR YOU TO DO YOUR WORK, TAKE CARE OF THINGS AT HOME, OR GET ALONG WITH OTHER PEOPLE?: SOMEWHAT DIFFICULT
GAD7 TOTAL SCORE: 8
GAD7 TOTAL SCORE: 8
2. NOT BEING ABLE TO STOP OR CONTROL WORRYING: SEVERAL DAYS
5. BEING SO RESTLESS THAT IT IS HARD TO SIT STILL: SEVERAL DAYS
3. WORRYING TOO MUCH ABOUT DIFFERENT THINGS: SEVERAL DAYS
1. FEELING NERVOUS, ANXIOUS, OR ON EDGE: SEVERAL DAYS
IF YOU CHECKED OFF ANY PROBLEMS ON THIS QUESTIONNAIRE, HOW DIFFICULT HAVE THESE PROBLEMS MADE IT FOR YOU TO DO YOUR WORK, TAKE CARE OF THINGS AT HOME, OR GET ALONG WITH OTHER PEOPLE: SOMEWHAT DIFFICULT
7. FEELING AFRAID AS IF SOMETHING AWFUL MIGHT HAPPEN: SEVERAL DAYS
6. BECOMING EASILY ANNOYED OR IRRITABLE: MORE THAN HALF THE DAYS
4. TROUBLE RELAXING: SEVERAL DAYS
7. FEELING AFRAID AS IF SOMETHING AWFUL MIGHT HAPPEN: SEVERAL DAYS

## 2023-06-28 ASSESSMENT — PATIENT HEALTH QUESTIONNAIRE - PHQ9: SUM OF ALL RESPONSES TO PHQ QUESTIONS 1-9: 7

## 2023-06-28 ASSESSMENT — PAIN SCALES - GENERAL: PAINLEVEL: NO PAIN (0)

## 2023-06-28 NOTE — PROGRESS NOTES
"  Assessment & Plan       ICD-10-CM    1. Anxiety  F41.9 sertraline (ZOLOFT) 100 MG tablet      2. Major depressive disorder, recurrent episode, moderate (H)  F33.1 sertraline (ZOLOFT) 100 MG tablet      3. Snoring  R06.83 Adult Sleep Eval & Management  Referral        Mood is significantly improved with the new medication dosing.  Though she still has significant issues with fatigue and her significant other is reporting snoring that she can hear from the other room and she stated that he even noticed her waking at night from this.  She does have high risk factors for sleep apnea given this information though had been not wanting to test that she thought it might be just related to her mood causing fatigue.  In the end we strongly encouraged her to evaluate this to find out the source of it even though she is still somewhat hesitant for treatment.  We did talk about CPAP is most likely treatment for her and that it can become adjustable for her as it may significantly make her more functional during life and she will keep this in mind      10 minutes spent by me on the date of the encounter doing chart review, history and exam, documentation and further activities per the note       BMI:   Estimated body mass index is 30.59 kg/m  as calculated from the following:    Height as of this encounter: 1.557 m (5' 1.3\").    Weight as of this encounter: 74.2 kg (163 lb 8 oz).   Weight management plan: Discussed healthy diet and exercise guidelines        Carmita Ramos MD, MD  [facility] ANNIE Bennett is a 38 year old, presenting for the following health issues:  Hypertension        6/28/2023    11:02 AM   Additional Questions   Roomed by SALMA   Accompanied by NA         6/28/2023    11:02 AM   Patient Reported Additional Medications   Patient reports taking the following new medications None     History of Present Illness       Mental Health Follow-up:  Patient presents to follow-up on " "Depression.Patient's depression since last visit has been:  Better  The patient is not having other symptoms associated with depression.      Any significant life events: No  Patient is not feeling anxious or having panic attacks.  Patient has no concerns about alcohol or drug use.    Hypertension: She presents for follow up of hypertension.  She does not check blood pressure  regularly outside of the clinic. Outpatient blood pressures have not been over 140/90. She does not follow a low salt diet.     She eats 0-1 servings of fruits and vegetables daily.She consumes 1 sweetened beverage(s) daily.She exercises with enough effort to increase her heart rate 20 to 29 minutes per day.  She exercises with enough effort to increase her heart rate 3 or less days per week.   She is taking medications regularly.  Today's PENNY-7 Score: 8               Review of Systems   Constitutional, HEENT, cardiovascular, pulmonary, GI, , musculoskeletal, neuro, skin, endocrine and psych systems are negative, except as otherwise noted.      Objective    /76   Pulse 65   Temp 97  F (36.1  C) (Temporal)   Resp 16   Ht 1.557 m (5' 1.3\")   Wt 74.2 kg (163 lb 8 oz)   LMP 06/04/2023   SpO2 99%   BMI 30.59 kg/m    Body mass index is 30.59 kg/m .  Physical Exam   GENERAL: healthy, alert and no distress  RESP: lungs clear to auscultation - no rales, rhonchi or wheezes  CV: regular rate and rhythm, normal S1 S2, no S3 or S4, no murmur, click or rub, no peripheral edema and peripheral pulses strong  MS: no gross musculoskeletal defects noted, no edema  SKIN: no suspicious lesions or rashes  NEURO: Normal strength and tone, mentation intact and speech normal  PSYCH: mentation appears normal, affect normal/bright                    "

## 2023-12-04 ENCOUNTER — MYC MEDICAL ADVICE (OUTPATIENT)
Dept: FAMILY MEDICINE | Facility: OTHER | Age: 38
End: 2023-12-04
Payer: COMMERCIAL

## 2023-12-04 DIAGNOSIS — F33.1 MAJOR DEPRESSIVE DISORDER, RECURRENT EPISODE, MODERATE (H): ICD-10-CM

## 2023-12-04 DIAGNOSIS — F41.9 ANXIETY: ICD-10-CM

## 2023-12-05 NOTE — TELEPHONE ENCOUNTER
"Office note from 6/8/23 states, \" I certainly can increase her sertraline again as well to see if this helps but she is agreeable to trying counseling as she also feels this could be helpful.  Referral was placed.\"  "

## 2023-12-06 RX ORDER — SERTRALINE HYDROCHLORIDE 100 MG/1
100 TABLET, FILM COATED ORAL DAILY
COMMUNITY
Start: 2023-12-06 | End: 2024-01-09

## 2024-01-09 DIAGNOSIS — F41.9 ANXIETY: ICD-10-CM

## 2024-01-09 DIAGNOSIS — F33.1 MAJOR DEPRESSIVE DISORDER, RECURRENT EPISODE, MODERATE (H): ICD-10-CM

## 2024-01-09 RX ORDER — SERTRALINE HYDROCHLORIDE 100 MG/1
TABLET, FILM COATED ORAL DAILY
Qty: 135 TABLET | Refills: 0 | Status: SHIPPED | OUTPATIENT
Start: 2024-01-09 | End: 2024-01-31

## 2024-01-30 ASSESSMENT — ANXIETY QUESTIONNAIRES
7. FEELING AFRAID AS IF SOMETHING AWFUL MIGHT HAPPEN: SEVERAL DAYS
8. IF YOU CHECKED OFF ANY PROBLEMS, HOW DIFFICULT HAVE THESE MADE IT FOR YOU TO DO YOUR WORK, TAKE CARE OF THINGS AT HOME, OR GET ALONG WITH OTHER PEOPLE?: SOMEWHAT DIFFICULT
3. WORRYING TOO MUCH ABOUT DIFFERENT THINGS: SEVERAL DAYS
2. NOT BEING ABLE TO STOP OR CONTROL WORRYING: SEVERAL DAYS
7. FEELING AFRAID AS IF SOMETHING AWFUL MIGHT HAPPEN: SEVERAL DAYS
GAD7 TOTAL SCORE: 6
GAD7 TOTAL SCORE: 6
IF YOU CHECKED OFF ANY PROBLEMS ON THIS QUESTIONNAIRE, HOW DIFFICULT HAVE THESE PROBLEMS MADE IT FOR YOU TO DO YOUR WORK, TAKE CARE OF THINGS AT HOME, OR GET ALONG WITH OTHER PEOPLE: SOMEWHAT DIFFICULT
4. TROUBLE RELAXING: NOT AT ALL
GAD7 TOTAL SCORE: 6
6. BECOMING EASILY ANNOYED OR IRRITABLE: MORE THAN HALF THE DAYS
1. FEELING NERVOUS, ANXIOUS, OR ON EDGE: SEVERAL DAYS
5. BEING SO RESTLESS THAT IT IS HARD TO SIT STILL: NOT AT ALL

## 2024-01-30 ASSESSMENT — PATIENT HEALTH QUESTIONNAIRE - PHQ9
10. IF YOU CHECKED OFF ANY PROBLEMS, HOW DIFFICULT HAVE THESE PROBLEMS MADE IT FOR YOU TO DO YOUR WORK, TAKE CARE OF THINGS AT HOME, OR GET ALONG WITH OTHER PEOPLE: SOMEWHAT DIFFICULT
SUM OF ALL RESPONSES TO PHQ QUESTIONS 1-9: 8
SUM OF ALL RESPONSES TO PHQ QUESTIONS 1-9: 8

## 2024-01-31 ENCOUNTER — VIRTUAL VISIT (OUTPATIENT)
Dept: FAMILY MEDICINE | Facility: OTHER | Age: 39
End: 2024-01-31
Payer: COMMERCIAL

## 2024-01-31 DIAGNOSIS — F41.9 ANXIETY: ICD-10-CM

## 2024-01-31 DIAGNOSIS — F33.1 MAJOR DEPRESSIVE DISORDER, RECURRENT EPISODE, MODERATE (H): ICD-10-CM

## 2024-01-31 DIAGNOSIS — F32.81 PREMENSTRUAL DYSPHORIC SYNDROME: Primary | ICD-10-CM

## 2024-01-31 PROCEDURE — 99214 OFFICE O/P EST MOD 30 MIN: CPT | Mod: 95 | Performed by: FAMILY MEDICINE

## 2024-01-31 PROCEDURE — 96127 BRIEF EMOTIONAL/BEHAV ASSMT: CPT | Mod: 95 | Performed by: FAMILY MEDICINE

## 2024-01-31 RX ORDER — LEVONORGESTREL/ETHIN.ESTRADIOL 0.1-0.02MG
1 TABLET ORAL DAILY
Qty: 112 TABLET | Status: SHIPPED | OUTPATIENT
Start: 2024-01-31

## 2024-01-31 RX ORDER — SERTRALINE HYDROCHLORIDE 100 MG/1
150 TABLET, FILM COATED ORAL DAILY
Qty: 135 TABLET | Refills: 1 | Status: SHIPPED | OUTPATIENT
Start: 2024-01-31 | End: 2024-07-25

## 2024-01-31 NOTE — PROGRESS NOTES
"    Instructions Relayed to Patient by Virtual Roomer:     Patient is active on [employer]:   Relayed following to patient: \"It looks like you are active on [employer], are you able to join the visit this way? If not, do you need us to send you a link now or would you like your provider to send a link via text or email when they are ready to initiate the visit?\"    Reminded patient to ensure they were logged on to virtual visit by arrival time listed. Documented in appointment notes if patient had flexibility to initiate visit sooner than arrival time. If pediatric virtual visit, ensured pediatric patient along with parent/guardian will be present for video visit.     Patient offered the website [email or web address] and/or phone number to [employer] Help line: [phone]    Sabrina is a 38 year old who is being evaluated via a billable video visit.      How would you like to obtain your AVS? Zinkia  If the video visit is dropped, the invitation should be resent by: Text to cell phone: [phone]  Will anyone else be joining your video visit? No      Assessment & Plan         ICD-10-CM    1. Premenstrual dysphoric syndrome  F32.81 levonorgestrel-ethinyl estradiol (AVIANE) 0.1-20 MG-MCG tablet      2. Anxiety  F41.9 sertraline (ZOLOFT) 100 MG tablet      3. Major depressive disorder, recurrent episode, moderate (H)  F33.1 sertraline (ZOLOFT) 100 MG tablet          Patient generally feels her mood is doing well though she has worsening of the mood typically the week before her menstrual cycle.  She has the ParaGard in place for birth control so she did not need anything further for birth control but she is missing her hormonal control.  She occasionally has a little heavier periods as well but at this time her emotional changes just before her menstrual cycle is the biggest thing she would like to treat.  We did offer her options and she would like to start with low-dose continuous birth control to see if this " "can help her keep her more stable.  We also renewed her sertraline at current dosing and plan follow-up in person in 6 months    I spent a total of 9 minutes on the day of the visit.   Time spent by me doing chart review, history and exam, documentation and further activities per the note    Carmita Ramos MD     BMI  Estimated body mass index is 30.59 kg/m  as calculated from the following:    Height as of 6/28/23: 1.557 m (5' 1.3\").    Weight as of 6/28/23: 74.2 kg (163 lb 8 oz).   Weight management plan: Discussed healthy diet and exercise guidelines    Depression Screening Follow Up        1/30/2024    12:31 PM   PHQ   PHQ-9 Total Score 8   Q9: Thoughts of better off dead/self-harm past 2 weeks Several days   F/U: Thoughts of suicide or self-harm No   F/U: Safety concerns No       Life is good ,though gets more depressed the 1-2 weeks before periods.          Follow Up      Discussed the following ways the patient can remain in a safe environment:  be around others        Rubio Bennett is a 38 year old, presenting for the following health issues:  Recheck Medication and MH Follow Up        1/31/2024    10:17 AM   Additional Questions   Roomed by Mauricio ACOSTA   Accompanied by Self     History of Present Illness       Mental Health Follow-up:  Patient presents to follow-up on Depression & Anxiety.Patient's depression since last visit has been:  No change  The patient is not having other symptoms associated with depression.  Patient's anxiety since last visit has been:  No change  The patient is not having other symptoms associated with anxiety.  Any significant life events: job concerns and financial concerns  Patient is not feeling anxious or having panic attacks.  Patient has no concerns about alcohol or drug use.    She eats 0-1 servings of fruits and vegetables daily.She consumes 1 sweetened beverage(s) daily.She exercises with enough effort to increase her heart rate 30 to 60 minutes per day.  She exercises with " enough effort to increase her heart rate 4 days per week.   She is taking medications regularly.       Objective           Vitals:  No vitals were obtained today due to virtual visit.    Physical Exam   GENERAL: alert and no distress  EYES: Eyes grossly normal to inspection.  No discharge or erythema, or obvious scleral/conjunctival abnormalities.  RESP: No audible wheeze, cough, or visible cyanosis.    SKIN: Visible skin clear. No significant rash, abnormal pigmentation or lesions.  NEURO: Cranial nerves grossly intact.  Mentation and speech appropriate for age.  PSYCH: Appropriate affect, tone, and pace of words          Video-Visit Details    Type of service:  Video Visit     Originating Location (pt. Location): Home    Distant Location (provider location):  On-site  Platform used for Video Visit: Aly  Signed Electronically by: Carmita Ramos MD, MD

## 2024-05-07 ENCOUNTER — MYC MEDICAL ADVICE (OUTPATIENT)
Dept: FAMILY MEDICINE | Facility: OTHER | Age: 39
End: 2024-05-07
Payer: COMMERCIAL

## 2024-05-07 ASSESSMENT — PATIENT HEALTH QUESTIONNAIRE - PHQ9
SUM OF ALL RESPONSES TO PHQ QUESTIONS 1-9: 6
10. IF YOU CHECKED OFF ANY PROBLEMS, HOW DIFFICULT HAVE THESE PROBLEMS MADE IT FOR YOU TO DO YOUR WORK, TAKE CARE OF THINGS AT HOME, OR GET ALONG WITH OTHER PEOPLE: SOMEWHAT DIFFICULT
SUM OF ALL RESPONSES TO PHQ QUESTIONS 1-9: 6

## 2024-05-07 NOTE — TELEPHONE ENCOUNTER
Patient Quality Outreach    Patient is due for the following:   Depression  -  PHQ-9 needed    Next Steps:   Patient was assigned appropriate questionnaire to complete    Type of outreach:    Sent [employer] message.      Questions for provider review:    None           Zeinab Putnam MA

## 2024-06-29 ENCOUNTER — HEALTH MAINTENANCE LETTER (OUTPATIENT)
Age: 39
End: 2024-06-29

## 2024-07-03 ENCOUNTER — PATIENT OUTREACH (OUTPATIENT)
Dept: FAMILY MEDICINE | Facility: OTHER | Age: 39
End: 2024-07-03
Payer: COMMERCIAL

## 2024-07-03 NOTE — TELEPHONE ENCOUNTER
Patient Quality Outreach    Patient is due for the following:   Depression  -  PHQ-9 needed    Next Steps:   No follow up needed at this time.    Type of outreach:    Chart review performed, no outreach needed.      Questions for provider review:    None           Zeinab Putnam MA

## 2024-07-25 DIAGNOSIS — F41.9 ANXIETY: ICD-10-CM

## 2024-07-25 DIAGNOSIS — F33.1 MAJOR DEPRESSIVE DISORDER, RECURRENT EPISODE, MODERATE (H): ICD-10-CM

## 2024-07-25 RX ORDER — SERTRALINE HYDROCHLORIDE 100 MG/1
150 TABLET, FILM COATED ORAL DAILY
Qty: 135 TABLET | Refills: 1 | Status: SHIPPED | OUTPATIENT
Start: 2024-07-25

## 2024-09-07 ENCOUNTER — HEALTH MAINTENANCE LETTER (OUTPATIENT)
Age: 39
End: 2024-09-07

## 2024-09-20 ENCOUNTER — OFFICE VISIT (OUTPATIENT)
Dept: URGENT CARE | Facility: URGENT CARE | Age: 39
End: 2024-09-20
Payer: COMMERCIAL

## 2024-09-20 VITALS
WEIGHT: 178 LBS | RESPIRATION RATE: 16 BRPM | BODY MASS INDEX: 33.31 KG/M2 | SYSTOLIC BLOOD PRESSURE: 137 MMHG | HEART RATE: 93 BPM | OXYGEN SATURATION: 99 % | DIASTOLIC BLOOD PRESSURE: 98 MMHG | TEMPERATURE: 100.1 F

## 2024-09-20 DIAGNOSIS — R07.0 THROAT PAIN: ICD-10-CM

## 2024-09-20 DIAGNOSIS — J06.9 URI WITH COUGH AND CONGESTION: ICD-10-CM

## 2024-09-20 DIAGNOSIS — R50.9 FEVER IN ADULT: Primary | ICD-10-CM

## 2024-09-20 DIAGNOSIS — R05.1 ACUTE COUGH: ICD-10-CM

## 2024-09-20 LAB
DEPRECATED S PYO AG THROAT QL EIA: NEGATIVE
FLUAV AG SPEC QL IA: NEGATIVE
FLUBV AG SPEC QL IA: NEGATIVE
GROUP A STREP BY PCR: NOT DETECTED
SARS-COV-2 RNA RESP QL NAA+PROBE: POSITIVE

## 2024-09-20 PROCEDURE — 87651 STREP A DNA AMP PROBE: CPT | Performed by: FAMILY MEDICINE

## 2024-09-20 PROCEDURE — 99213 OFFICE O/P EST LOW 20 MIN: CPT | Performed by: FAMILY MEDICINE

## 2024-09-20 PROCEDURE — 87804 INFLUENZA ASSAY W/OPTIC: CPT | Performed by: FAMILY MEDICINE

## 2024-09-20 PROCEDURE — 87635 SARS-COV-2 COVID-19 AMP PRB: CPT | Performed by: FAMILY MEDICINE

## 2024-09-20 RX ORDER — ALBUTEROL SULFATE 90 UG/1
2 AEROSOL, METERED RESPIRATORY (INHALATION) EVERY 6 HOURS PRN
Qty: 18 G | Refills: 0 | Status: SHIPPED | OUTPATIENT
Start: 2024-09-20

## 2024-09-20 NOTE — PROGRESS NOTES
SUBJECTIVE:   Rossy Nagel is a 39 year old female presenting with a chief complaint of fever, stuffy nose, cough - productive, sore throat, and fatigue.  Onset of symptoms was 1 day(s) ago.  Course of illness is same.    Severity moderate  Current and Associated symptoms:   Treatment measures tried include Tylenol/Ibuprofen.  Predisposing factors include ill contact: Family member .    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     Current Outpatient Medications   Medication Sig Dispense Refill    levonorgestrel-ethinyl estradiol (AVIANE) 0.1-20 MG-MCG tablet Take 1 tablet by mouth daily 112 tablet prn    loratadine (CLARITIN) 10 MG tablet Take 1 tablet (10 mg) by mouth daily 90 tablet 1    sertraline (ZOLOFT) 100 MG tablet TAKE ONE AND ONE-HALF TABLETS DAILY 135 tablet 1    paragard intrauterine copper 1 each by Intrauterine route once (Patient not taking: Reported on 9/20/2024)       Social History     Tobacco Use    Smoking status: Never     Passive exposure: Never    Smokeless tobacco: Never   Substance Use Topics    Alcohol use: Yes     Comment: Rarely       ROS:  Review of systems negative except as stated above.    OBJECTIVE:  BP (!) 137/98   Pulse 93   Temp 100.1  F (37.8  C) (Tympanic)   Resp 16   Wt 80.7 kg (178 lb)   SpO2 99%   BMI 33.31 kg/m    GENERAL APPEARANCE: healthy, alert and no distress  EYES: EOMI,  PERRL, conjunctiva clear  HENT: ear canals and TM's normal.  Nose and mouth without ulcers, erythema or lesions  NECK: supple, nontender, no lymphadenopathy  RESP: lungs clear to auscultation - no rales, rhonchi or wheezes  CV: regular rates and rhythm, normal S1 S2, no murmur noted  SKIN: no suspicious lesions or rashes    ASSESSMENT:  1. Fever in adult    - Streptococcus A Rapid Screen w/Reflex to PCR  - Influenza A & B Antigen  - Symptomatic COVID-19  Virus (Coronavirus) by PCR Nose  - Group A Streptococcus PCR Throat Swab    2. Throat pain    - Streptococcus A Rapid Screen w/Reflex to PCR  - Group A Streptococcus PCR Throat Swab    3. URI with cough and congestion    - albuterol (PROAIR HFA/PROVENTIL HFA/VENTOLIN HFA) 108 (90 Base) MCG/ACT inhaler; Inhale 2 puffs into the lungs every 6 hours as needed for shortness of breath, wheezing or cough.  Dispense: 18 g; Refill: 0    4. Acute cough    - albuterol (PROAIR HFA/PROVENTIL HFA/VENTOLIN HFA) 108 (90 Base) MCG/ACT inhaler; Inhale 2 puffs into the lungs every 6 hours as needed for shortness of breath, wheezing or cough.  Dispense: 18 g; Refill: 0    Patient Instructions   Sinus pressure is primarily a problem of drainage.  You can best help your body clear the sinus secretions and pressure by opening up the natural passageways which are often blocked by viral colds and allergies.      Short courses of a nasal decongestant spray (Afrin or Neosinephrine) are one of the most effective tools in opening sinus drainage passageways.  Their use should be restricted to 3 days though due to the high risk of nasal addiction.  Pseudoephedrine or phenylephrine (Sudafed) is often helpful but it can cause elevations in blood pressure and insomnia.     Sometimes a nasal saline spray will help rinse out the nasal passages and feel good.     Guaifenesin (Robitussin or Mucinex) helps loosen secretions and often help make the mucous more liquid and easier to clear.    For pain and fevers, acetaminophen (Tylenol) is most appropriate.  Ibuprofen (Advil) or naproxen (Aleve) are useful too and last longer but they can cause elevation of blood pressure or stomach problems.    Antihistamines (Benadryl, Dimetapp, etc.) cause sedation, confusion, bowel and urinary abnormalities and are of little use for infectious causes of cough and nasal congestion.  Their use should be reserved for allergic symptoms.    The body needs to be treated well  in order to help heal itself.  Rest as needed.  It is ok to reduce food intake if appetite is poor but it is quite important to maintain/increase fluid intake.  Sinus pressure and infections usually go away on their own with appropriate care.  If symptoms worsen or persist beyond 10 days, an antibiotic might be worth trying to treat a possible bacterial component.      Cough may last a few weeks longer than other symptoms. As long as there is improvement over time this is normal as everything heals up from the original virus.     Mckenna Lockwood M.D.

## 2024-09-20 NOTE — PATIENT INSTRUCTIONS
Sinus pressure is primarily a problem of drainage.  You can best help your body clear the sinus secretions and pressure by opening up the natural passageways which are often blocked by viral colds and allergies.      Short courses of a nasal decongestant spray (Afrin or Neosinephrine) are one of the most effective tools in opening sinus drainage passageways.  Their use should be restricted to 3 days though due to the high risk of nasal addiction.  Pseudoephedrine or phenylephrine (Sudafed) is often helpful but it can cause elevations in blood pressure and insomnia.     Sometimes a nasal saline spray will help rinse out the nasal passages and feel good.     Guaifenesin (Robitussin or Mucinex) helps loosen secretions and often help make the mucous more liquid and easier to clear.    For pain and fevers, acetaminophen (Tylenol) is most appropriate.  Ibuprofen (Advil) or naproxen (Aleve) are useful too and last longer but they can cause elevation of blood pressure or stomach problems.    Antihistamines (Benadryl, Dimetapp, etc.) cause sedation, confusion, bowel and urinary abnormalities and are of little use for infectious causes of cough and nasal congestion.  Their use should be reserved for allergic symptoms.    The body needs to be treated well in order to help heal itself.  Rest as needed.  It is ok to reduce food intake if appetite is poor but it is quite important to maintain/increase fluid intake.  Sinus pressure and infections usually go away on their own with appropriate care.  If symptoms worsen or persist beyond 10 days, an antibiotic might be worth trying to treat a possible bacterial component.      Cough may last a few weeks longer than other symptoms. As long as there is improvement over time this is normal as everything heals up from the original virus.

## 2025-01-20 DIAGNOSIS — F41.9 ANXIETY: ICD-10-CM

## 2025-01-20 DIAGNOSIS — F33.1 MAJOR DEPRESSIVE DISORDER, RECURRENT EPISODE, MODERATE (H): ICD-10-CM

## 2025-01-22 RX ORDER — SERTRALINE HYDROCHLORIDE 100 MG/1
150 TABLET, FILM COATED ORAL DAILY
Qty: 135 TABLET | Refills: 0 | Status: SHIPPED | OUTPATIENT
Start: 2025-01-22

## 2025-01-22 RX ORDER — SERTRALINE HYDROCHLORIDE 100 MG/1
150 TABLET, FILM COATED ORAL DAILY
Qty: 135 TABLET | Refills: 3 | OUTPATIENT
Start: 2025-01-22

## 2025-03-03 DIAGNOSIS — F32.81 PREMENSTRUAL DYSPHORIC SYNDROME: ICD-10-CM

## 2025-03-04 RX ORDER — LEVONORGESTREL/ETHIN.ESTRADIOL 0.1-0.02MG
TABLET ORAL
Qty: 112 TABLET | Refills: 0 | Status: SHIPPED | OUTPATIENT
Start: 2025-03-04

## 2025-03-10 ENCOUNTER — OFFICE VISIT (OUTPATIENT)
Dept: FAMILY MEDICINE | Facility: OTHER | Age: 40
End: 2025-03-10
Payer: COMMERCIAL

## 2025-03-10 VITALS
SYSTOLIC BLOOD PRESSURE: 136 MMHG | BODY MASS INDEX: 33.42 KG/M2 | HEIGHT: 61 IN | HEART RATE: 94 BPM | OXYGEN SATURATION: 98 % | TEMPERATURE: 98.6 F | DIASTOLIC BLOOD PRESSURE: 84 MMHG | WEIGHT: 177 LBS | RESPIRATION RATE: 18 BRPM

## 2025-03-10 DIAGNOSIS — M25.561 PATELLOFEMORAL ARTHRALGIA OF RIGHT KNEE: ICD-10-CM

## 2025-03-10 DIAGNOSIS — F41.9 ANXIETY: ICD-10-CM

## 2025-03-10 DIAGNOSIS — F32.81 PREMENSTRUAL DYSPHORIC SYNDROME: ICD-10-CM

## 2025-03-10 DIAGNOSIS — E66.09 CLASS 1 OBESITY DUE TO EXCESS CALORIES WITHOUT SERIOUS COMORBIDITY WITH BODY MASS INDEX (BMI) OF 31.0 TO 31.9 IN ADULT: Primary | ICD-10-CM

## 2025-03-10 DIAGNOSIS — Z12.31 VISIT FOR SCREENING MAMMOGRAM: ICD-10-CM

## 2025-03-10 DIAGNOSIS — E78.2 MIXED HYPERLIPIDEMIA: ICD-10-CM

## 2025-03-10 DIAGNOSIS — E66.811 CLASS 1 OBESITY DUE TO EXCESS CALORIES WITHOUT SERIOUS COMORBIDITY WITH BODY MASS INDEX (BMI) OF 31.0 TO 31.9 IN ADULT: Primary | ICD-10-CM

## 2025-03-10 DIAGNOSIS — R73.03 PREDIABETES: ICD-10-CM

## 2025-03-10 DIAGNOSIS — F33.1 MAJOR DEPRESSIVE DISORDER, RECURRENT EPISODE, MODERATE (H): ICD-10-CM

## 2025-03-10 LAB
CHOLEST SERPL-MCNC: 238 MG/DL
EST. AVERAGE GLUCOSE BLD GHB EST-MCNC: 123 MG/DL
FASTING STATUS PATIENT QL REPORTED: NO
HBA1C MFR BLD: 5.9 % (ref 0–5.6)
HDLC SERPL-MCNC: 44 MG/DL
LDLC SERPL CALC-MCNC: 138 MG/DL
NONHDLC SERPL-MCNC: 194 MG/DL
TRIGL SERPL-MCNC: 278 MG/DL

## 2025-03-10 PROCEDURE — 96127 BRIEF EMOTIONAL/BEHAV ASSMT: CPT | Performed by: FAMILY MEDICINE

## 2025-03-10 PROCEDURE — 3075F SYST BP GE 130 - 139MM HG: CPT | Performed by: FAMILY MEDICINE

## 2025-03-10 PROCEDURE — 36415 COLL VENOUS BLD VENIPUNCTURE: CPT | Performed by: FAMILY MEDICINE

## 2025-03-10 PROCEDURE — 99214 OFFICE O/P EST MOD 30 MIN: CPT | Performed by: FAMILY MEDICINE

## 2025-03-10 PROCEDURE — 1125F AMNT PAIN NOTED PAIN PRSNT: CPT | Performed by: FAMILY MEDICINE

## 2025-03-10 PROCEDURE — 80061 LIPID PANEL: CPT | Performed by: FAMILY MEDICINE

## 2025-03-10 PROCEDURE — 3079F DIAST BP 80-89 MM HG: CPT | Performed by: FAMILY MEDICINE

## 2025-03-10 PROCEDURE — 83036 HEMOGLOBIN GLYCOSYLATED A1C: CPT | Performed by: FAMILY MEDICINE

## 2025-03-10 RX ORDER — SERTRALINE HYDROCHLORIDE 100 MG/1
150 TABLET, FILM COATED ORAL DAILY
Qty: 135 TABLET | Refills: 1 | Status: SHIPPED | OUTPATIENT
Start: 2025-03-10

## 2025-03-10 RX ORDER — LEVONORGESTREL/ETHIN.ESTRADIOL 0.1-0.02MG
1 TABLET ORAL DAILY
Qty: 112 TABLET | Refills: 4 | Status: SHIPPED | OUTPATIENT
Start: 2025-03-10

## 2025-03-10 ASSESSMENT — PATIENT HEALTH QUESTIONNAIRE - PHQ9
10. IF YOU CHECKED OFF ANY PROBLEMS, HOW DIFFICULT HAVE THESE PROBLEMS MADE IT FOR YOU TO DO YOUR WORK, TAKE CARE OF THINGS AT HOME, OR GET ALONG WITH OTHER PEOPLE: SOMEWHAT DIFFICULT
SUM OF ALL RESPONSES TO PHQ QUESTIONS 1-9: 7
SUM OF ALL RESPONSES TO PHQ QUESTIONS 1-9: 7

## 2025-03-10 ASSESSMENT — ANXIETY QUESTIONNAIRES
7. FEELING AFRAID AS IF SOMETHING AWFUL MIGHT HAPPEN: NOT AT ALL
5. BEING SO RESTLESS THAT IT IS HARD TO SIT STILL: SEVERAL DAYS
7. FEELING AFRAID AS IF SOMETHING AWFUL MIGHT HAPPEN: NOT AT ALL
GAD7 TOTAL SCORE: 2
4. TROUBLE RELAXING: NOT AT ALL
IF YOU CHECKED OFF ANY PROBLEMS ON THIS QUESTIONNAIRE, HOW DIFFICULT HAVE THESE PROBLEMS MADE IT FOR YOU TO DO YOUR WORK, TAKE CARE OF THINGS AT HOME, OR GET ALONG WITH OTHER PEOPLE: SOMEWHAT DIFFICULT
2. NOT BEING ABLE TO STOP OR CONTROL WORRYING: NOT AT ALL
1. FEELING NERVOUS, ANXIOUS, OR ON EDGE: NOT AT ALL
3. WORRYING TOO MUCH ABOUT DIFFERENT THINGS: NOT AT ALL
6. BECOMING EASILY ANNOYED OR IRRITABLE: SEVERAL DAYS
8. IF YOU CHECKED OFF ANY PROBLEMS, HOW DIFFICULT HAVE THESE MADE IT FOR YOU TO DO YOUR WORK, TAKE CARE OF THINGS AT HOME, OR GET ALONG WITH OTHER PEOPLE?: SOMEWHAT DIFFICULT
GAD7 TOTAL SCORE: 2
GAD7 TOTAL SCORE: 2

## 2025-03-10 ASSESSMENT — PAIN SCALES - GENERAL: PAINLEVEL_OUTOF10: MILD PAIN (2)

## 2025-03-10 NOTE — PROGRESS NOTES
"  Assessment & Plan     Visit for screening mammogram:  - Screening mammogram recommended due to age and dense breast tissue.  - Order for tomographic mammogram placed. Patient to schedule via [facility] at preferred location.    Prediabetes:  - Prediabetes confirmed with previous A1c above normal but not in diabetes range.  - Lifestyle modifications recommended, including cardiovascular activity and dietary changes focusing on complex carbohydrates and proteins. No medications recommended at this time.    Class 1 obesity due to excess calories without serious comorbidity with body mass index (BMI) of 31.0 to 31.9 in adult:  - Class 1 obesity noted.  - Lifestyle modifications recommended, including cardiovascular activity and dietary changes.    Mixed hyperlipidemia:  - Mixed hyperlipidemia noted.  - Screening for cholesterol recommended. Lifestyle modifications suggested.    Major depressive disorder, recurrent episode, moderate (H):  - Major depressive disorder managed with sertraline 150 mg daily.  - Continue current sertraline dosage. No changes recommended.    Premenstrual dysphoric syndrome:  - Premenstrual dysphoric syndrome managed with continuous birth control pills.  - Continue current birth control regimen.  scheduled for vasectomy in April.    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  - Physical therapy recommended to build strength and stabilize the knee. Exercises provided.    Consent was obtained from the patient to use an AI documentation tool in the creation of this note.          BMI  Estimated body mass index is 33.12 kg/m  as calculated from the following:    Height as of this encounter: 1.557 m (5' 1.3\").    Weight as of this encounter: 80.3 kg (177 lb).   Weight management plan: Discussed healthy diet and exercise guidelines          Rubio Bennett is a 39 year old, presenting for the following health issues:  Recheck " "Medication        3/10/2025     1:13 PM   Additional Questions   Roomed by alda   Accompanied by self     History of Present Illness       Reason for visit:  Yearly check up.   She is taking medications regularly.   - Knee pain for a while, not debilitating, noticed during weight-bearing activities like walking and climbing stairs.  - Pain located in the middle of the knee joint, possibly more on the right side.  - No swelling noticed.  - No specific injury recalled, gradual onset.  - Pre-diabetes status noted from previous A1c test, above normal but not in diabetes range.  - Noticed increased hunger, possibly related to thirst or dehydration.  - Last period was about a year ago, taking birth control continuously to manage menstrual cycle and hormone control.      Medication Followup of Sertraline  150mg   Taking Medication as prescribed: yes  Side Effects:  None  Medication Helping Symptoms:  yes        Review of Systems  Constitutional, HEENT, cardiovascular, pulmonary, GI, , musculoskeletal, neuro, skin, endocrine and psych systems are negative, except as otherwise noted.      Objective    /84   Pulse 94   Temp 98.6  F (37  C) (Temporal)   Resp 18   Ht 1.557 m (5' 1.3\")   Wt 80.3 kg (177 lb)   SpO2 98%   BMI 33.12 kg/m    Body mass index is 33.12 kg/m .  Physical Exam   GENERAL: alert and no distress  RESP: lungs clear to auscultation - no rales, rhonchi or wheezes  CV: regular rate and rhythm, normal S1 S2, no S3 or S4, no murmur, click or rub, no peripheral edema  ABDOMEN: soft, nontender, no hepatosplenomegaly, no masses and bowel sounds normal  MS: no gross musculoskeletal defects noted, no edema  SKIN: no suspicious lesions or rashes  NEURO: Normal strength and tone, mentation intact and speech normal  PSYCH: mentation appears normal, affect normal/bright  Right Knee Exam     Tenderness   The patient is experiencing tenderness in the medial joint line.    Range of Motion   The patient has " normal right knee ROM.    Tests   Lachman:  Anterior - negative    Posterior - negative  Drawer:  Anterior - negative    Posterior - negative  Pivot shift: negative  Patellar apprehension: positive      Left Knee Exam   Left knee exam is normal.    Range of Motion   The patient has normal left knee ROM.                    Signed Electronically by: Carmita Ramos MD, MD

## 2025-05-08 ENCOUNTER — HOSPITAL ENCOUNTER (EMERGENCY)
Facility: CLINIC | Age: 40
Discharge: HOME | End: 2025-05-08
Attending: EMERGENCY MEDICINE
Payer: COMMERCIAL

## 2025-05-08 ENCOUNTER — APPOINTMENT (OUTPATIENT)
Dept: CT IMAGING | Facility: CLINIC | Age: 40
End: 2025-05-08
Attending: EMERGENCY MEDICINE
Payer: COMMERCIAL

## 2025-05-08 VITALS
DIASTOLIC BLOOD PRESSURE: 94 MMHG | RESPIRATION RATE: 20 BRPM | SYSTOLIC BLOOD PRESSURE: 179 MMHG | OXYGEN SATURATION: 99 % | HEART RATE: 108 BPM | TEMPERATURE: 98.6 F | WEIGHT: 180 LBS | BODY MASS INDEX: 33.68 KG/M2

## 2025-05-08 DIAGNOSIS — R10.13 EPIGASTRIC PAIN: ICD-10-CM

## 2025-05-08 LAB
ALBUMIN SERPL BCG-MCNC: 4.3 G/DL (ref 3.5–5.2)
ALBUMIN UR-MCNC: 30 MG/DL
ALP SERPL-CCNC: 95 U/L (ref 40–150)
ALT SERPL W P-5'-P-CCNC: 34 U/L (ref 0–50)
ANION GAP SERPL CALCULATED.3IONS-SCNC: 12 MMOL/L (ref 7–15)
APPEARANCE UR: ABNORMAL
AST SERPL W P-5'-P-CCNC: 28 U/L (ref 0–45)
BASOPHILS # BLD AUTO: 0.1 10E3/UL (ref 0–0.2)
BASOPHILS NFR BLD AUTO: 1 %
BILIRUB SERPL-MCNC: <0.2 MG/DL
BILIRUB UR QL STRIP: NEGATIVE
BUN SERPL-MCNC: 17.1 MG/DL (ref 6–20)
CALCIUM SERPL-MCNC: 9.9 MG/DL (ref 8.8–10.4)
CHLORIDE SERPL-SCNC: 100 MMOL/L (ref 98–107)
COLOR UR AUTO: YELLOW
CREAT SERPL-MCNC: 0.67 MG/DL (ref 0.51–0.95)
EGFRCR SERPLBLD CKD-EPI 2021: >90 ML/MIN/1.73M2
EOSINOPHIL # BLD AUTO: 0.2 10E3/UL (ref 0–0.7)
EOSINOPHIL NFR BLD AUTO: 2 %
ERYTHROCYTE [DISTWIDTH] IN BLOOD BY AUTOMATED COUNT: 12.4 % (ref 10–15)
GLUCOSE SERPL-MCNC: 150 MG/DL (ref 70–99)
GLUCOSE UR STRIP-MCNC: 30 MG/DL
HCG UR QL: NEGATIVE
HCO3 SERPL-SCNC: 24 MMOL/L (ref 22–29)
HCT VFR BLD AUTO: 40.7 % (ref 35–47)
HGB BLD-MCNC: 13.7 G/DL (ref 11.7–15.7)
HGB UR QL STRIP: ABNORMAL
IMM GRANULOCYTES # BLD: 0 10E3/UL
IMM GRANULOCYTES NFR BLD: 0 %
KETONES UR STRIP-MCNC: NEGATIVE MG/DL
LEUKOCYTE ESTERASE UR QL STRIP: ABNORMAL
LYMPHOCYTES # BLD AUTO: 3.7 10E3/UL (ref 0.8–5.3)
LYMPHOCYTES NFR BLD AUTO: 29 %
MCH RBC QN AUTO: 29.3 PG (ref 26.5–33)
MCHC RBC AUTO-ENTMCNC: 33.7 G/DL (ref 31.5–36.5)
MCV RBC AUTO: 87 FL (ref 78–100)
MONOCYTES # BLD AUTO: 0.7 10E3/UL (ref 0–1.3)
MONOCYTES NFR BLD AUTO: 6 %
MUCOUS THREADS #/AREA URNS LPF: PRESENT /LPF
NEUTROPHILS # BLD AUTO: 7.9 10E3/UL (ref 1.6–8.3)
NEUTROPHILS NFR BLD AUTO: 63 %
NITRATE UR QL: NEGATIVE
NRBC # BLD AUTO: 0 10E3/UL
NRBC BLD AUTO-RTO: 0 /100
PH UR STRIP: 5.5 [PH] (ref 5–7)
PLATELET # BLD AUTO: 340 10E3/UL (ref 150–450)
POTASSIUM SERPL-SCNC: 3.8 MMOL/L (ref 3.4–5.3)
PROT SERPL-MCNC: 7.2 G/DL (ref 6.4–8.3)
RBC # BLD AUTO: 4.67 10E6/UL (ref 3.8–5.2)
RBC URINE: 4 /HPF
SODIUM SERPL-SCNC: 136 MMOL/L (ref 135–145)
SP GR UR STRIP: 1.03 (ref 1–1.03)
SQUAMOUS EPITHELIAL: 17 /HPF
UROBILINOGEN UR STRIP-MCNC: NORMAL MG/DL
WBC # BLD AUTO: 12.6 10E3/UL (ref 4–11)
WBC URINE: 6 /HPF

## 2025-05-08 PROCEDURE — 87086 URINE CULTURE/COLONY COUNT: CPT | Performed by: EMERGENCY MEDICINE

## 2025-05-08 PROCEDURE — 99284 EMERGENCY DEPT VISIT MOD MDM: CPT | Mod: 25 | Performed by: EMERGENCY MEDICINE

## 2025-05-08 PROCEDURE — 36415 COLL VENOUS BLD VENIPUNCTURE: CPT | Performed by: EMERGENCY MEDICINE

## 2025-05-08 PROCEDURE — 250N000011 HC RX IP 250 OP 636: Performed by: EMERGENCY MEDICINE

## 2025-05-08 PROCEDURE — 96374 THER/PROPH/DIAG INJ IV PUSH: CPT | Mod: 59 | Performed by: EMERGENCY MEDICINE

## 2025-05-08 PROCEDURE — 250N000011 HC RX IP 250 OP 636: Mod: JZ | Performed by: EMERGENCY MEDICINE

## 2025-05-08 PROCEDURE — 74177 CT ABD & PELVIS W/CONTRAST: CPT

## 2025-05-08 PROCEDURE — 96375 TX/PRO/DX INJ NEW DRUG ADDON: CPT | Performed by: EMERGENCY MEDICINE

## 2025-05-08 PROCEDURE — 81001 URINALYSIS AUTO W/SCOPE: CPT | Performed by: EMERGENCY MEDICINE

## 2025-05-08 PROCEDURE — 250N000009 HC RX 250: Performed by: EMERGENCY MEDICINE

## 2025-05-08 PROCEDURE — 76705 ECHO EXAM OF ABDOMEN: CPT | Mod: 26 | Performed by: EMERGENCY MEDICINE

## 2025-05-08 PROCEDURE — 99285 EMERGENCY DEPT VISIT HI MDM: CPT | Mod: 25 | Performed by: EMERGENCY MEDICINE

## 2025-05-08 PROCEDURE — 250N000013 HC RX MED GY IP 250 OP 250 PS 637: Performed by: EMERGENCY MEDICINE

## 2025-05-08 PROCEDURE — 85025 COMPLETE CBC W/AUTO DIFF WBC: CPT | Performed by: EMERGENCY MEDICINE

## 2025-05-08 PROCEDURE — 76705 ECHO EXAM OF ABDOMEN: CPT | Performed by: EMERGENCY MEDICINE

## 2025-05-08 PROCEDURE — 81025 URINE PREGNANCY TEST: CPT | Performed by: EMERGENCY MEDICINE

## 2025-05-08 PROCEDURE — 82310 ASSAY OF CALCIUM: CPT | Performed by: EMERGENCY MEDICINE

## 2025-05-08 RX ORDER — HYDROMORPHONE HYDROCHLORIDE 1 MG/ML
0.5 INJECTION, SOLUTION INTRAMUSCULAR; INTRAVENOUS; SUBCUTANEOUS
Refills: 0 | Status: DISCONTINUED | OUTPATIENT
Start: 2025-05-08 | End: 2025-05-08 | Stop reason: HOSPADM

## 2025-05-08 RX ORDER — IOPAMIDOL 755 MG/ML
88 INJECTION, SOLUTION INTRAVASCULAR ONCE
Status: COMPLETED | OUTPATIENT
Start: 2025-05-08 | End: 2025-05-08

## 2025-05-08 RX ORDER — OLANZAPINE 10 MG/1
2.5 INJECTION, POWDER, LYOPHILIZED, FOR SOLUTION INTRAMUSCULAR ONCE
Status: COMPLETED | OUTPATIENT
Start: 2025-05-08 | End: 2025-05-08

## 2025-05-08 RX ORDER — FAMOTIDINE 20 MG/1
40 TABLET, FILM COATED ORAL ONCE
Status: COMPLETED | OUTPATIENT
Start: 2025-05-08 | End: 2025-05-08

## 2025-05-08 RX ORDER — ONDANSETRON 4 MG/1
4 TABLET, ORALLY DISINTEGRATING ORAL ONCE
Status: COMPLETED | OUTPATIENT
Start: 2025-05-08 | End: 2025-05-08

## 2025-05-08 RX ORDER — MAGNESIUM HYDROXIDE/ALUMINUM HYDROXICE/SIMETHICONE 120; 1200; 1200 MG/30ML; MG/30ML; MG/30ML
30 SUSPENSION ORAL ONCE
Status: COMPLETED | OUTPATIENT
Start: 2025-05-08 | End: 2025-05-08

## 2025-05-08 RX ADMIN — ALUMINUM HYDROXIDE, MAGNESIUM HYDROXIDE, AND SIMETHICONE 30 ML: 200; 200; 20 SUSPENSION ORAL at 02:40

## 2025-05-08 RX ADMIN — FAMOTIDINE 40 MG: 20 TABLET, FILM COATED ORAL at 02:40

## 2025-05-08 RX ADMIN — IOPAMIDOL 88 ML: 755 INJECTION, SOLUTION INTRAVENOUS at 03:05

## 2025-05-08 RX ADMIN — HYDROMORPHONE HYDROCHLORIDE 0.5 MG: 1 INJECTION, SOLUTION INTRAMUSCULAR; INTRAVENOUS; SUBCUTANEOUS at 03:17

## 2025-05-08 RX ADMIN — OLANZAPINE 2.5 MG: 10 INJECTION, POWDER, FOR SOLUTION INTRAMUSCULAR at 04:04

## 2025-05-08 RX ADMIN — ONDANSETRON 4 MG: 4 TABLET, ORALLY DISINTEGRATING ORAL at 01:46

## 2025-05-08 RX ADMIN — SODIUM CHLORIDE 62 ML: 9 INJECTION, SOLUTION INTRAVENOUS at 03:05

## 2025-05-08 ASSESSMENT — ACTIVITIES OF DAILY LIVING (ADL)
ADLS_ACUITY_SCORE: 41

## 2025-05-08 ASSESSMENT — COLUMBIA-SUICIDE SEVERITY RATING SCALE - C-SSRS
2. HAVE YOU ACTUALLY HAD ANY THOUGHTS OF KILLING YOURSELF IN THE PAST MONTH?: NO
1. IN THE PAST MONTH, HAVE YOU WISHED YOU WERE DEAD OR WISHED YOU COULD GO TO SLEEP AND NOT WAKE UP?: NO
6. HAVE YOU EVER DONE ANYTHING, STARTED TO DO ANYTHING, OR PREPARED TO DO ANYTHING TO END YOUR LIFE?: NO

## 2025-05-08 NOTE — DISCHARGE INSTRUCTIONS
I am not certain what is causing your symptoms, so far all the tests have been reassuring.  They may be related to irritation of your stomach.  I want you to follow-up with surgery for an endoscopy to took a good look at your stomach.  Drink plenty of water.  Try taking famotidine (Pepcid) 40 mg daily to see if this can help with some of your symptoms.  Avoid triggers of heartburn such as alcohol, tobacco, caffeine, carbonated beverages, or acidic foods such as citrus, tomatoes, and chocolate.

## 2025-05-08 NOTE — ED PROVIDER NOTES
History     Chief Complaint   Patient presents with    Abdominal Pain     HPI  Rossy Nagel is a 39 year old female who presents for right upper quadrant abdominal pain.  The patient says that this pain has been getting worse over the past several hours.  Located in the right upper quadrant and wraps around to her back and up into her shoulder.  She has had nausea and vomiting.  No fevers.  Normal bowel movements yesterday.  No prior abdominal surgeries.  No chest pain.    Allergies:  Allergies   Allergen Reactions    Nkda [No Known Drug Allergy]        Problem List:    Patient Active Problem List    Diagnosis Date Noted    ASCUS of cervix with negative high risk HPV 06/08/2023     Priority: Medium     2009, 2012, 2014  NIL paps  2/9/17 NIL pap, Neg HPV  6/8/23 ASCUS pap, Neg HPV. 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) 03/05/09       Past Surgical History:    Past Surgical History:   Procedure Laterality Date    NO HISTORY OF SURGERY         Family History:    Family History   Problem Relation Age of Onset    Diabetes Mother         Type II    Depression Mother     Obesity Mother         overweight    Hypertension Mother     Breast Cancer Mother     Hypertension Maternal Grandmother     Heart Disease Maternal Grandfather         Bypass    Hypertension Maternal Grandfather     Cancer Paternal Grandmother         ovarian or uterine    Asthma Brother        Social History:  Marital Status:   [2]  Social History     Tobacco Use     Smoking status: Never     Passive exposure: Never    Smokeless tobacco: Never   Vaping Use    Vaping status: Never Used   Substance Use Topics    Alcohol use: Yes     Comment: Rarely    Drug use: No        Medications:    levonorgestrel-ethinyl estradiol (AVIANE) 0.1-20 MG-MCG tablet  loratadine (CLARITIN) 10 MG tablet  paragard intrauterine copper  sertraline (ZOLOFT) 100 MG tablet          Review of Systems    Physical Exam   BP: (!) 173/119  Pulse: 108  Temp: 98.6  F (37  C)  Resp: 20  Weight: 81.6 kg (180 lb)  SpO2: 98 %      Physical Exam  Constitutional:       General: She is in acute distress.      Appearance: She is well-developed.   HENT:      Head: Normocephalic and atraumatic.   Cardiovascular:      Rate and Rhythm: Normal rate.   Pulmonary:      Effort: No respiratory distress.      Breath sounds: No stridor.   Abdominal:      Tenderness: There is abdominal tenderness in the right upper quadrant and epigastric area.   Skin:     General: Skin is warm and dry.   Neurological:      Mental Status: She is alert.         ED Course        Procedures    Results for orders placed during the hospital encounter of 05/08/25    POC US ABDOMEN LIMITED    Impression  [facility] Procedure Note    Limited Bedside ED Gallbladder  Ultrasound:    PROCEDURE: PERFORMED BY: Dr. Ambrosio Zuniga MD  INDICATIONS:  RUQ/Epigastric Pain and Nausea and Vomiting  PROBE:  Low frequency convex probe  BODY LOCATION: Abdomen  FINDINGS:   An ultrasound of the gallbladder was performed using longitudinal and transverse views.  Gallstone(s):  Absent  Gallbladder sludge:  Absent  Sonographic Zelaya's sign:  Absent  Gallbladder wall thickening (greater than 4 mm):  Absent  Pericholecystic fluid: Absent  Common bile duct (dilated if internal diameter greater than 6 mm): Unable to visualize  INTERPRETATION:  The gallbladder evaluation is normal with no gallstones/sludge, no sonographic Zelaya's sign, no GB wall thickening, no  pericholecystic fluid, and without evidence of cholelithiasis or cholecystitis.  IMAGE DOCUMENTATION: Images were archived to PACs system.            Critical Care time:  none     None         Results for orders placed or performed during the hospital encounter of 05/08/25 (from the past 24 hours)   CBC with Platelets & Differential    Narrative    The following orders were created for panel order CBC with Platelets & Differential.  Procedure                               Abnormality         Status                     ---------                               -----------         ------                     CBC with platelets and ...[[id number]]  Abnormal            Final result                 Please view results for these tests on the individual orders.   Comprehensive metabolic panel   Result Value Ref Range    Sodium 136 135 - 145 mmol/L    Potassium 3.8 3.4 - 5.3 mmol/L    Carbon Dioxide (CO2) 24 22 - 29 mmol/L    Anion Gap 12 7 - 15 mmol/L    Urea Nitrogen 17.1 6.0 - 20.0 mg/dL    Creatinine 0.67 0.51 - 0.95 mg/dL    GFR Estimate >90 >60 mL/min/1.73m2    Calcium 9.9 8.8 - 10.4 mg/dL    Chloride 100 98 - 107 mmol/L    Glucose 150 (H) 70 - 99 mg/dL    Alkaline Phosphatase 95 40 - 150 U/L    AST 28 0 - 45 U/L    ALT 34 0 - 50 U/L    Protein Total 7.2 6.4 - 8.3 g/dL    Albumin 4.3 3.5 - 5.2 g/dL    Bilirubin Total <0.2 <=1.2 mg/dL   CBC with platelets and differential   Result Value Ref Range    WBC Count 12.6 (H) 4.0 - 11.0 10e3/uL    RBC Count 4.67 3.80 - 5.20 10e6/uL    Hemoglobin 13.7 11.7 - 15.7 g/dL    Hematocrit 40.7 35.0 - 47.0 %    MCV 87 78 - 100 fL    MCH 29.3 26.5 - 33.0 pg    MCHC 33.7 31.5 - 36.5 g/dL    RDW 12.4 10.0 - 15.0 %    Platelet Count 340 150 - 450 10e3/uL    % Neutrophils 63 %    % Lymphocytes 29 %    % Monocytes 6 %    % Eosinophils 2 %    % Basophils 1 %    % Immature Granulocytes 0 %    NRBCs per 100 WBC 0 <1 /100    Absolute Neutrophils 7.9 1.6 - 8.3 10e3/uL    Absolute Lymphocytes 3.7 0.8  - 5.3 10e3/uL    Absolute Monocytes 0.7 0.0 - 1.3 10e3/uL    Absolute Eosinophils 0.2 0.0 - 0.7 10e3/uL    Absolute Basophils 0.1 0.0 - 0.2 10e3/uL    Absolute Immature Granulocytes 0.0 <=0.4 10e3/uL    Absolute NRBCs 0.0 10e3/uL   HCG qualitative urine   Result Value Ref Range    hCG Urine Qualitative Negative Negative   UA with Microscopic reflex to Culture    Specimen: Urine, Clean Catch   Result Value Ref Range    Color Urine Yellow Colorless, Straw, Light Yellow, Yellow    Appearance Urine Slightly Cloudy (A) Clear    Glucose Urine 30 (A) Negative mg/dL    Bilirubin Urine Negative Negative    Ketones Urine Negative Negative mg/dL    Specific Gravity Urine 1.032 1.003 - 1.035    Blood Urine Moderate (A) Negative    pH Urine 5.5 5.0 - 7.0    Protein Albumin Urine 30 (A) Negative mg/dL    Urobilinogen Urine Normal Normal mg/dL    Nitrite Urine Negative Negative    Leukocyte Esterase Urine Moderate (A) Negative    Mucus Urine Present (A) None Seen /LPF    RBC Urine 4 (H) <=2 /HPF    WBC Urine 6 (H) <=5 /HPF    Squamous Epithelials Urine 17 (H) <=1 /HPF    Narrative    Urine Culture ordered based on laboratory criteria   POC US ABDOMEN LIMITED    Impression    [facility] Procedure Note      Limited Bedside ED Gallbladder  Ultrasound:    PROCEDURE: PERFORMED BY: Dr. Ambrosio Zuniga MD  INDICATIONS:  RUQ/Epigastric Pain and Nausea and Vomiting  PROBE:  Low frequency convex probe  BODY LOCATION: Abdomen  FINDINGS:   An ultrasound of the gallbladder was performed using longitudinal and transverse views.  Gallstone(s):  Absent  Gallbladder sludge:  Absent  Sonographic Zelaya's sign:  Absent  Gallbladder wall thickening (greater than 4 mm):  Absent  Pericholecystic fluid: Absent  Common bile duct (dilated if internal diameter greater than 6 mm): Unable to visualize   INTERPRETATION:  The gallbladder evaluation is normal with no gallstones/sludge, no sonographic Zelaya's sign, no GB wall thickening, no  pericholecystic fluid, and without evidence of cholelithiasis or cholecystitis.  IMAGE DOCUMENTATION: Images were archived to PACs system.      CT Abdomen Pelvis w Contrast    Narrative    EXAM: CT ABDOMEN PELVIS W CONTRAST  LOCATION: [facility]  DATE: 5/8/2025    INDICATION: Epigastric and right upper abdominal pain with vomiting.  COMPARISON: CT abdomen and pelvis with IV contrast 8/5/2009.  TECHNIQUE: CT scan of the abdomen and pelvis was performed following injection of IV contrast. Multiplanar reformats were obtained. Dose reduction techniques were used.  CONTRAST: 88 mL Isovue 370.    FINDINGS:   LOWER CHEST: Lung bases are clear. No pleural effusion on either side. Normal cardiac size. No pericardial effusion.    HEPATOBILIARY: Diffusely fatty infiltrated enlarged liver, length of the right lobe measures 21 cm (image 49, series 4), previously 16.3 cm. Smooth hepatic contour. No discrete hepatic lesion, calcified gallstones, biliary dilatation or adjacent   inflammation. Patent hepatic and portal veins.    PANCREAS: Normal.    SPLEEN: Normal splenic size. No discrete lesion. Small accessory splenule.    ADRENAL GLANDS: Normal.    KIDNEYS/BLADDER: No urinary tract calculi. Couple of minor cortical simple cysts have developed in the lower aspect of the right kidney, no specific follow-up required. Both kidneys are well-perfused without hydronephrosis or hydroureter. Resolved   bilateral caliectasis and ureterectasis attributable to gravid uterus demonstrated previously.    BOWEL: Normal appendix. Formed stool material and scattered gas within normal caliber colon. No mechanical obstruction, free gas or free fluid.    LYMPH NODES: No suspicious abdominopelvic adenopathy.    VASCULATURE: Normal caliber abdominal aorta and the IVC.    PELVIC ORGANS: Resolved intrauterine gestation seen previously. IUCD has been placed, located in the lower uterine segment. Dominant follicle in the right  ovary. No adenopathy or free fluid. Normal appendix.    MUSCULOSKELETAL: Anatomic alignment of the spine, bones and joints of the pelvis. Tiny fat-containing ventral umbilical hernia.      Impression    IMPRESSION:   1.  No acute inflammatory changes in the abdomen or pelvis. Normal appendix. Formed stool material and scattered gas within normal caliber colon. No obstruction, free gas or free fluid.    2.  Diffusely fatty infiltrated enlarged liver, more apparent today. Spleen is normal. No ascites.    3.  Resolved intrauterine gestation seen previously. IUCD has been placed, located in the lower uterine segment. Dominant follicle in the right ovary.    4.  Couple of minor cortical simple cysts have developed in the lower aspect of the right kidney, no specific follow-up required. 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       Medications   HYDROmorphone (PF) (DILAUDID) injection 0.5 mg (0.5 mg Intravenous $Given 5/8/25 0317)   ondansetron (ZOFRAN ODT) ODT tab 4 mg (4 mg Oral $Given 5/8/25 0146)   alum & mag hydroxide-simethicone (MAALOX) suspension 30 mL (30 mLs Oral $Given 5/8/25 0240)   famotidine (PEPCID) tablet 40 mg (40 mg Oral $Given 5/8/25 0240)   sodium chloride 0.9 % bag for CT scan flush (62 mLs Intravenous $Given 5/8/25 0305)   iopamidol (ISOVUE-370) solution 88 mL (88 mLs Intravenous $Given 5/8/25 0305)   OLANZapine (zyPREXA) IV injection 2.5 mg (2.5 mg Intravenous $Given 5/8/25 0404)       Assessments & Plan (with Medical Decision Making)   39-year-old female presents with right upper quadrant abdominal pain with nausea and vomiting.  High risk presentation concerning for the possibility of a surgical process in her abdomen such as obstruction, appendicitis, cholecystitis.  Bedside ultrasound is negative for signs of cholelithiasis or cholecystitis.  Urine pregnancy test is negative, unlikely ectopic pregnancy.  White blood cell count is  mildly elevated 12.6 and hemoglobin is 13.7.  Electrolytes are within normal limits.  LFTs are normal, no signs of hepatitis.  She is given ondansetron, Maalox, and famotidine for her symptoms without any improvement and so therefore was given IV hydromorphone.  CT of the abdomen and pelvis obtained, images interpreted dependently as well as radiology read reviewed, no signs of appendicitis, bowel obstruction, or acute cholecystitis on this.  On recheck she is still uncomfortable and is given IV olanzapine.  I discussed with the patient regarding the findings and we talked more about her symptoms, she says that these typically happen at night, she is not sure of a pattern to it happening, it has been going on and off over the past several months but getting progressively worse.  She does admit maybe this happens more when she is having foods that contain tomatoes.  This may represent gastric reflux or gastric ulcer, so far the rest of the tests have been reassuring.  At this time I believe she is safe to discharge home I have instructed her to try to eat a bland diet and drink plenty of water and avoid triggers for gastric reflux.  I will have her start taking ranitidine and have placed a referral for endoscopy follow-up.  She told to return if worse, otherwise follow-up in clinic.  The patient is in agreement with this plan.    I have reviewed the nursing notes.    I have reviewed the findings, diagnosis, plan and need for follow up with the patient.           Medical Decision Making  The patient's presentation was of high complexity (an acute health issue posing potential threat to life or bodily function).    The patient's evaluation involved:  ordering and/or review of 3+ test(s) in this encounter (see separate area of note for details)  independent interpretation of testing performed by another health professional (see separate area of note for details)    The patient's management necessitated high risk (a  parenteral controlled substance).        New Prescriptions    No medications on file       Final diagnoses:   Epigastric pain       5/8/2025   [facility] EMERGENCY DEPT       Ambrosio Zuniga MD  05/08/25 [id number]

## 2025-05-08 NOTE — ED TRIAGE NOTES
Pt here with upper abdominal pain that radiates to the her right shoulder blade. Pt reports nausea. Denies fevers, diarrhea.

## 2025-05-09 ENCOUNTER — RESULTS FOLLOW-UP (OUTPATIENT)
Dept: NURSING | Facility: CLINIC | Age: 40
End: 2025-05-09

## 2025-05-09 LAB — BACTERIA UR CULT: NORMAL

## 2025-06-05 ENCOUNTER — PATIENT OUTREACH (OUTPATIENT)
Dept: CARE COORDINATION | Facility: CLINIC | Age: 40
End: 2025-06-05
Payer: COMMERCIAL

## 2025-06-24 ENCOUNTER — ANCILLARY PROCEDURE (OUTPATIENT)
Dept: MAMMOGRAPHY | Facility: CLINIC | Age: 40
End: 2025-06-24
Attending: FAMILY MEDICINE
Payer: COMMERCIAL

## 2025-06-24 DIAGNOSIS — Z12.31 VISIT FOR SCREENING MAMMOGRAM: ICD-10-CM

## 2025-06-24 PROCEDURE — 77063 BREAST TOMOSYNTHESIS BI: CPT | Mod: TC | Performed by: RADIOLOGY

## 2025-06-24 PROCEDURE — 77067 SCR MAMMO BI INCL CAD: CPT | Mod: TC | Performed by: RADIOLOGY
